# Patient Record
Sex: FEMALE | Race: WHITE | NOT HISPANIC OR LATINO | URBAN - METROPOLITAN AREA
[De-identification: names, ages, dates, MRNs, and addresses within clinical notes are randomized per-mention and may not be internally consistent; named-entity substitution may affect disease eponyms.]

---

## 2017-03-17 ENCOUNTER — RECORDS - HEALTHEAST (OUTPATIENT)
Dept: LAB | Facility: CLINIC | Age: 27
End: 2017-03-17

## 2017-03-17 LAB
CHOLEST SERPL-MCNC: 183 MG/DL
FASTING STATUS PATIENT QL REPORTED: YES
HDLC SERPL-MCNC: 62 MG/DL
LDLC SERPL CALC-MCNC: 113 MG/DL
TRIGL SERPL-MCNC: 41 MG/DL

## 2017-03-22 LAB
MTHFR MUTATION - HISTORICAL: NORMAL
SPECIMEN STATUS: NORMAL
THROMBOPHLIA RISK ASSESSMENT-MTR - HISTORICAL: NORMAL

## 2018-05-26 ENCOUNTER — TRANSFERRED RECORDS (OUTPATIENT)
Dept: HEALTH INFORMATION MANAGEMENT | Facility: CLINIC | Age: 28
End: 2018-05-26

## 2021-08-07 ENCOUNTER — TRANSFERRED RECORDS (OUTPATIENT)
Dept: HEALTH INFORMATION MANAGEMENT | Facility: CLINIC | Age: 31
End: 2021-08-07

## 2022-03-10 ENCOUNTER — TRANSFERRED RECORDS (OUTPATIENT)
Dept: HEALTH INFORMATION MANAGEMENT | Facility: CLINIC | Age: 32
End: 2022-03-10

## 2022-07-18 ENCOUNTER — MYC MEDICAL ADVICE (OUTPATIENT)
Dept: INTERNAL MEDICINE | Facility: CLINIC | Age: 32
End: 2022-07-18

## 2022-07-19 ENCOUNTER — VIRTUAL VISIT (OUTPATIENT)
Dept: INTERNAL MEDICINE | Facility: CLINIC | Age: 32
End: 2022-07-19
Payer: COMMERCIAL

## 2022-07-19 DIAGNOSIS — M24.9 HYPERMOBILE JOINTS: Primary | ICD-10-CM

## 2022-07-19 DIAGNOSIS — R51.9 CHRONIC DAILY HEADACHE: ICD-10-CM

## 2022-07-19 DIAGNOSIS — G43.709 CHRONIC MIGRAINE WITHOUT AURA WITHOUT STATUS MIGRAINOSUS, NOT INTRACTABLE: ICD-10-CM

## 2022-07-19 DIAGNOSIS — Z76.89 ENCOUNTER TO ESTABLISH CARE: ICD-10-CM

## 2022-07-19 PROCEDURE — 99215 OFFICE O/P EST HI 40 MIN: CPT | Mod: 95 | Performed by: NURSE PRACTITIONER

## 2022-07-19 RX ORDER — CALCIUM CARBONATE/VITAMIN D3 500-10/5ML
LIQUID (ML) ORAL
COMMUNITY
End: 2023-10-06

## 2022-07-19 RX ORDER — MULTIVIT-MIN/IRON/FOLIC ACID/K 18-600-40
2000 CAPSULE ORAL
COMMUNITY
End: 2024-07-05

## 2022-07-19 RX ORDER — LISDEXAMFETAMINE DIMESYLATE 30 MG/1
30 CAPSULE ORAL EVERY MORNING
COMMUNITY
End: 2023-10-06

## 2022-07-19 NOTE — PROGRESS NOTES
Екатерина is a 32 year old who is being evaluated via a billable video visit.      How would you like to obtain your AVS? MyChart  If the video visit is dropped, the invitation should be resent by: Send to e-mail at: [email or web address]  Will anyone else be joining your video visit? Tiffanie Levy VF    Assessment & Plan       Encounter to establish care  Hypermobile joints  Chronic daily headache  Chronic migraine without aura without status migrainosus, not intractable  Establishing care for a second opinion; she has had several healthcare providers suggest that she likely has EDS and would like to meet with Dr. Carpenter given his experience with this condition. Discussed limited genetic testing to confirm a diagnosis when primary symptom is hypermobility. Management involves treating individual symptoms, including careful movement/continue with PT as she has been doing as well as ongoing follow-up with Neurology for headache management. She is concerned about cervical instability related to EDS or CSF leak.  Encouraged continue with pushing fluids and electrolytes for management of POTS.  Will discuss further with Dr. Carpenter for possible consult.      51 minutes spent on the date of the encounter doing chart review, history and exam, documentation and further activities per the note      Return in about 8 weeks (around 9/13/2022).    MAJO Reyes [facility] INTERNAL MEDICINE [facility]   Екатерина is a 32 year old, presenting for the following health issues:  Video Visit (Est Care, Harvey-Danlos syndromes (EDS) - per patient )      HPI     Establish care.  Was hoping to work with Dr. Carpenter, suspects she has EDS.   Has been told by several other health care providers that she has features consistent with EDS, but has never been officially diagnosed.  Sprained fingers frequently in childhood but other than TMJ dislocation, no other significant joint injuries.  "Notes that joints sublax frequently, can pop in and out of place easily. Slight hypermobile elbow, fingers very hypermobile, and skin slightly hypermobile.   Had an autonomic assessment at [facility] last year.    Was active/athletic as a kid, around age 17 started getting low back pain, knee pain and had to stop running/swimming. Started PT. Things worsened in college--pain in feet and difficulty getting around campus. Started birth control, developed anxiety with this, so stopped it but still had \"intense PMS\". Was waiting for back to heal, became a , developed more hip and knee problems. Continued with PT over the last 10 years. General flu-like symptoms throughout college and frequent illnesses (had strep throat 5x, multiple UTIs, would develop symptoms of feeling sick, vomit, go to hospital to get fluids and would sleep for 2 weeks).   Attended college on [employer], was supposed to go to medical school but decided not to go due to health; was diagnosed with chronic lyme disease but she questions whether this is the right diagnosis (from a specialty lab, not NICOLLE or Western blot, started treatment with herbs and felt worse, had been prescribed antibiotics and antifungals but didn't feel right about that and didn't complete the treatment).  Moved to [place], continued to not feel well.  Had a dentist visit 7 years ago, had pressed really hard on TMJ (dislocated TMJ)--developed bad headache, pressure, and intense dizziness. Describes it felt \"ischemic\". Couldn't work after that, difficult to be upright.  Since that time, has chronic daily headaches which never fully resolve. Following with Neurology at [facility]. Not classic migraine presentation, using Vyvanse over the last 2 months to try to reduce baseline headaches. Anything with vasoconstricting action seems to help partially reduce pain. Otherwise managing with OTC Tylenol or Ibuprofen (helps sometimes). Has found that various treatments might decrease " "intensity for headache for a short period of time, but then lose effectiveness after a few months. Hard to read or do normal activities d/t headaches, on disability; this is the most concerning aspect of her health for her, is difficulty to have a headache all the time. Would like to meet with someone with familiarity with EDS (concerned about cervical instability or Neuro questioned a CSF leak since symptoms triggered by TMJ dislocation). Last head imaging I can find in Care Everywhere:  \"FINDINGS:  Interpretation of outside head MRI without IV contrast dated   08/15/2020.   No mass effect, hydrocephalus, or abnormal areas of restricted diffusion or hemosiderin deposition. No extra-axial fluid collection. No evidence of venous sinus engorgement. Pituitary gland appears normal for age. No convincing evidence of brain sag. Internal auditory canals and inner ear structures appear grossly normal on this noncontrast exam.\"    Hx Hashimotos (improved since cutting out gluten) and Sjogrens, follows with Rheumatology at [facility], had been prescribed Plaquenil but appears they questioned if it would actually be of benefit.  POTS--eval at [facility] in Aug 2021--Holter monitor showed sinus rhythm with sinus arrhythmia, HR , avg 73, one PAC, one PVC, no symptomatic events, five systolic BP drops <90 mmHg recorded. 24-hr BP monitor done. Hx in past of transient CP, work-up negative. Reports her dysautonomia improved with Neuro/Post-concussion clinic with visual exercises. Also drinks electrolytes all day long to manage.   Hx PCOS.  Treating TMJ at [facility] in [place], thinks it's maybe improving, working with specialty dentist ([place] in [place]).   Bad hip pain, hip dysplasia, suspects will be having surgery. Torn labrum (L side).  Following at [facility] with Dr. Walsh for Hip, has appt with Dr. Gage at [facility] coming up for 2nd opinion.     Hearing testing at [facility], reports hypermobile TM.  Had been on Naltrexone for 2 " months (helped briefly) for pcos. Primary in [place].  Per chart review in Care Everywhere, following with therapist through [facility] for hx depression, anxiety, trichotillomania, ADHD, bulimia nervosa (in remission), dyslexia, chronic pain.    Review of Systems   Constitutional, HEENT, cardiovascular, pulmonary, gi and gu systems are negative, except as otherwise noted.      Objective             Physical Exam   GENERAL: Healthy, alert and no distress  EYES: Eyes grossly normal to inspection.  No discharge or erythema, or obvious scleral/conjunctival abnormalities.  RESP: No audible wheeze, cough, or visible cyanosis.  No visible retractions or increased work of breathing.    SKIN: Visible skin clear. No significant rash, abnormal pigmentation or lesions. Demonstrates mild hyperextensible skin.  MSK: right elbow slightly hyperextends, bends thumbs/fingers to forearm  NEURO: Cranial nerves grossly intact.  Mentation and speech appropriate for age.  PSYCH: Mentation appears normal, affect normal/bright, judgement and insight intact, normal speech and appearance well-groomed.                Video-Visit Details    Video Start Time: 8:32 AM    Type of service:  Video Visit    Video End Time:8:59 AM    Originating Location (pt. Location): Home    Distant Location (provider location):  [facility] INTERNAL MEDICINE [place]     Platform used for Video Visit: [email or web address]    .  ..

## 2022-07-19 NOTE — PATIENT INSTRUCTIONS
Thank you for visiting the Primary Care Center today at the [facility]! The following is some information about our clinic:     Primary Care Center Frequently-Asked Questions    (1) How do I schedule appointments at the [facility]?     Primary Care--to schedule or make changes to an existing appointment, please call our primary care line at [phone].    Labs--to schedule a lab appointment at the [facility] you can use [employer] or call [phone]. If you have a [place] location that is closer to home, you can reach out to that location for scheduling options.     Imaging--if you need to schedule a CT, X-ray, MRI, ultrasound, or other imaging study you can call [phone] to schedule at the [facility] or any other [facility] imaging location.     Referrals--if a referral to another specialty was ordered you can expect a phone call from their scheduling team. If you have not heard from them in a week, please call us or send us a GENBAND message to check the status or get a scheduling number. Please note that this only applies to internal [facility] referrals. If the referral is external you would need to contact their office for scheduling.     (2) I have a question about my visit, who do I contact?     You can call us at the primary care line at [phone] to ask questions about your visit. You can also send a secure message through GENBAND, which is reviewed by clinic staff. Please note that [employer] messages have a twenty-four to forty-eight business hour turnaround time and should not be used for urgent concerns.    (3) How will I get the results of my tests?    If you are signed up for [employer] all tests will be released to you within twenty-four hours of resulting. Please allow three to five days for your doctor to review your results and place a note interpreting the results. If you do not have [employer]AL[employer] you will receive your  results through mail seven to ten business days following the return of the tests. Please note that if there should be any urgent or concerning results that your doctor or their registered nurse will reach out to you the same day as the tests come back. If you have follow up questions about your results or would like to discuss the results in detail please schedule a follow up with your provider either in person or virtually.     (4) How do I get refills of my prescriptions?     You should always first contact your pharmacy for refills of your medications. If submitting a refill request on [employer], please be sure to submit the request only once--repeat requests can cause delays in refill. If you are requesting a NEW medication or a medication related to new symptoms you will need to schedule an appointment with a provider prior to approval. Please note: Routine medication refills have up to one to three business day turnaround whereas controlled substances refills have up to five to seven business day turnaround.    (5) I have new symptoms, what do I do?     If you are having an immediate medical emergency, you should dial 911 for assistance.   For anything urgent that needs to be seen within a few hours to one day you should visit a local urgent care for assistance.  For non-urgent symptoms that need to be seen within a few days to a week you can schedule with an available provider in primary care by going to [employer] or calling [phone].   If you are not sure how serious your symptoms are or you would like to receive medical advice you can always call [phone] to speak with a triage nurse.

## 2022-07-19 NOTE — TELEPHONE ENCOUNTER
Patient already had her virtual visit this morning.      Darrell Campo CMA ([facility]) at 4:30 PM on 7/19/2022

## 2022-07-23 PROBLEM — F41.1 GENERALIZED ANXIETY DISORDER: Status: ACTIVE | Noted: 2022-05-04

## 2022-07-23 PROBLEM — F81.0 SPECIFIC LEARNING DISORDER WITH READING IMPAIRMENT: Status: ACTIVE | Noted: 2022-05-04

## 2022-07-23 PROBLEM — G89.4 CHRONIC PAIN DISORDER: Status: ACTIVE | Noted: 2022-05-04

## 2022-07-23 PROBLEM — F54 PSYCHOLOGICAL FACTORS AFFECTING MEDICAL CONDITION: Status: ACTIVE | Noted: 2022-05-04

## 2022-07-23 PROBLEM — Z86.19 HISTORY OF LYME DISEASE: Status: ACTIVE | Noted: 2018-06-11

## 2022-07-23 PROBLEM — R79.89 ELEVATED DEHYDROEPIANDROSTERONE (DHEA) LEVEL: Status: ACTIVE | Noted: 2022-01-06

## 2022-07-23 PROBLEM — R76.8 SS-A ANTIBODY POSITIVE: Status: ACTIVE | Noted: 2020-08-26

## 2022-07-23 PROBLEM — F32.A DEPRESSION: Status: ACTIVE | Noted: 2018-11-21

## 2022-07-23 PROBLEM — F33.1 MAJOR DEPRESSIVE DISORDER, RECURRENT EPISODE, MODERATE (H): Status: ACTIVE | Noted: 2022-05-04

## 2022-07-23 PROBLEM — E28.2 PCOS (POLYCYSTIC OVARIAN SYNDROME): Status: ACTIVE | Noted: 2019-11-25

## 2022-07-23 PROBLEM — G43.709 CHRONIC MIGRAINE WITHOUT AURA WITHOUT STATUS MIGRAINOSUS, NOT INTRACTABLE: Status: ACTIVE | Noted: 2020-06-12

## 2022-07-23 PROBLEM — R53.83 OTHER FATIGUE: Status: ACTIVE | Noted: 2018-11-21

## 2022-07-23 PROBLEM — F50.25: Status: ACTIVE | Noted: 2022-05-04

## 2022-07-23 PROBLEM — F63.3 TRICHOTILLOMANIA: Status: ACTIVE | Noted: 2022-05-04

## 2022-07-23 PROBLEM — M35.00 SJOGREN'S SYNDROME WITHOUT EXTRAGLANDULAR INVOLVEMENT (H): Status: ACTIVE | Noted: 2022-01-06

## 2022-07-25 ENCOUNTER — TELEPHONE (OUTPATIENT)
Dept: INTERNAL MEDICINE | Facility: CLINIC | Age: 32
End: 2022-07-25

## 2022-08-03 ENCOUNTER — TELEPHONE (OUTPATIENT)
Dept: INTERNAL MEDICINE | Facility: CLINIC | Age: 32
End: 2022-08-03

## 2022-08-03 NOTE — TELEPHONE ENCOUNTER
----- Message from MAJO Farfan CNP sent at 8/3/2022 10:32 AM CDT -----  Regarding: Schedule for one-time consult  Hello,    Would you help this patient schedule a one-time EDS consult with Dr. Carpenter? She understands that he is unable to take on new patients to establish care, but I spoke with him and he agreed to a consult for her.    Please let me know if you have any questions.  Thank you!  Geri

## 2022-08-03 NOTE — TELEPHONE ENCOUNTER
[facility] Dr. Carpenter for 30min slot - okayed per provider, no JOSE

## 2022-09-16 ENCOUNTER — TRANSFERRED RECORDS (OUTPATIENT)
Dept: HEALTH INFORMATION MANAGEMENT | Facility: CLINIC | Age: 32
End: 2022-09-16

## 2023-04-28 ENCOUNTER — MEDICAL CORRESPONDENCE (OUTPATIENT)
Dept: HEALTH INFORMATION MANAGEMENT | Facility: CLINIC | Age: 33
End: 2023-04-28
Payer: COMMERCIAL

## 2023-05-01 ENCOUNTER — TRANSCRIBE ORDERS (OUTPATIENT)
Dept: OTHER | Age: 33
End: 2023-05-01

## 2023-05-01 DIAGNOSIS — Q38.2 MACROGLOSSIA: Primary | ICD-10-CM

## 2023-05-16 NOTE — TELEPHONE ENCOUNTER
FUTURE VISIT INFORMATION:      FUTURE VISIT INFORMATION:    Date: 6/7/23    Time: 1 PM    Location: [facility] [place]  REFERRAL INFORMATION:    Referring provider: Dr. Alejo Gonzales    Referring providers clinic: [facility]    Reason for visit/diagnosis:  macroglossia of the base of tongue. further assessment and possible workup for transoral robotic base of tongue reduction surgical intervention - Referred by Dr. Alejo Gonzales @ [facility]    RECORDS REQUESTED FROM:       Clinic name Comments Records Status Imaging Status   [facility] 9/16/22 OV note- Dr. Alejo Gonzales Scanned in Epic

## 2023-05-22 ENCOUNTER — TRANSCRIBE ORDERS (OUTPATIENT)
Dept: OTHER | Age: 33
End: 2023-05-22

## 2023-05-22 DIAGNOSIS — G43.909 MIGRAINE SYNDROME: Primary | ICD-10-CM

## 2023-06-07 ENCOUNTER — PRE VISIT (OUTPATIENT)
Dept: OTOLARYNGOLOGY | Facility: CLINIC | Age: 33
End: 2023-06-07

## 2023-08-07 ENCOUNTER — TRANSFERRED RECORDS (OUTPATIENT)
Dept: HEALTH INFORMATION MANAGEMENT | Facility: CLINIC | Age: 33
End: 2023-08-07
Payer: COMMERCIAL

## 2023-08-15 NOTE — TELEPHONE ENCOUNTER
FUTURE VISIT INFORMATION:        FUTURE VISIT INFORMATION:  Date:  10/11/23  Time:  1 PM  Location: [facility] [place]  REFERRAL INFORMATION:  Referring provider: Dr. Alejo Gonzales  Referring providers clinic: [facility]  Reason for visit/diagnosis:  macroglossia of the base of tongue. further assessment and possible workup for transoral robotic base of tongue reduction surgical intervention - Referred by Dr. Alejo Gonzales @ [facility]     RECORDS REQUESTED FROM:         Clinic name Comments Records Status Imaging Status   [facility] 9/16/22 OV note- Dr. Alejo Gonzales Scanned in [facility]   [facility] MRN: [id number] MR Cervical 08/07/23  MR Brain 08/07/23  MR TMJ [id number]  send to scan 8/15/23 Pending req 8/15/23   PACS           August 15, 2023 at 2:04 PM - send req to [facility] for more records  and request for images from [facility]  August 15, 2023 at 3:22 PM - No additional records from [facility]  August 22, 2023 at 6:52 AM - Images resolved in [facility] -[facility]

## 2023-08-21 NOTE — TELEPHONE ENCOUNTER
Records Requested     August 22, 2023 8:35 AM   [id number]   Facility  [facility]    Outcome 8:39am Sent request for imaging to be pushed to PACS. -ROBYN Lam on 9/19/2023 at 11:46 AM Imaging resolved into PACS. -ROBYN     RECORDS RECEIVED FROM: Care Everywhere    REASON FOR VISIT: Migraine syndrome [G43.909]   Date of Appt: 10/23/23 8:00am    NOTES (FOR ALL VISITS) STATUS DETAILS   OFFICE NOTE from referring provider Care Everywhere 5/22/23 Dina Peraza MD @[facility]     OFFICE NOTE from other specialist Care Everywhere 5/10/23 (Telemedicine) Solheid, Corinne J, MD  @\[facility]    10/6/22, 7/19/22, 12/17/21 [facility]    7/19/22 Geri Loyola APRN CNP @[facility]     DISCHARGE REPORT from the ER Care Everywhere 12/20/22 Jovanna Martinez PA-C  @[facility]     MEDICATION LIST Internal    IMAGING  (FOR ALL VISITS)     X-RAY PACS   7/18/23 XR Cervical Spine     MRI (HEAD, NECK, SPINE) Internal [facility]  8/7/23 MR Brain  8/7/23 MR Cervical Spine  3/10/22 MR Temporomandibular Joints

## 2023-09-09 ENCOUNTER — HEALTH MAINTENANCE LETTER (OUTPATIENT)
Age: 33
End: 2023-09-09

## 2023-09-20 ENCOUNTER — PRE VISIT (OUTPATIENT)
Dept: OTOLARYNGOLOGY | Facility: CLINIC | Age: 33
End: 2023-09-20

## 2023-09-21 ENCOUNTER — TELEPHONE (OUTPATIENT)
Dept: INTERNAL MEDICINE | Facility: CLINIC | Age: 33
End: 2023-09-21
Payer: COMMERCIAL

## 2023-09-21 NOTE — TELEPHONE ENCOUNTER
The patient would like a call from the care team to discuss her upcoming appointment in detail thankyou

## 2023-09-22 ENCOUNTER — OFFICE VISIT (OUTPATIENT)
Dept: INTERNAL MEDICINE | Facility: CLINIC | Age: 33
End: 2023-09-22
Payer: COMMERCIAL

## 2023-09-22 VITALS
BODY MASS INDEX: 29.82 KG/M2 | OXYGEN SATURATION: 96 % | SYSTOLIC BLOOD PRESSURE: 126 MMHG | DIASTOLIC BLOOD PRESSURE: 88 MMHG | HEART RATE: 74 BPM | WEIGHT: 173.7 LBS

## 2023-09-22 DIAGNOSIS — Q79.62 HYPERMOBILE EHLERS-DANLOS SYNDROME: Primary | ICD-10-CM

## 2023-09-22 DIAGNOSIS — Q79.60 EDS (EHLERS-DANLOS SYNDROME): ICD-10-CM

## 2023-09-22 PROCEDURE — 99213 OFFICE O/P EST LOW 20 MIN: CPT | Performed by: PEDIATRICS

## 2023-09-22 RX ORDER — ATOGEPANT 60 MG/1
1 TABLET ORAL DAILY
COMMUNITY
Start: 2023-04-25 | End: 2023-10-06

## 2023-09-22 RX ORDER — CLINDAMYCIN PHOSPHATE 10 MG/G
GEL TOPICAL
COMMUNITY
Start: 2023-09-20 | End: 2023-10-06

## 2023-09-22 RX ORDER — METOCLOPRAMIDE 10 MG/1
TABLET ORAL
COMMUNITY
Start: 2023-09-14 | End: 2024-07-05

## 2023-09-22 RX ORDER — COVID-19 MOLECULAR TEST ASSAY
KIT MISCELLANEOUS
COMMUNITY
Start: 2022-10-21

## 2023-09-22 RX ORDER — ONDANSETRON 4 MG/1
TABLET, FILM COATED ORAL
COMMUNITY
Start: 2022-12-22 | End: 2023-10-06

## 2023-09-22 RX ORDER — LACTIC ACID, L-, CITRIC ACID MONOHYDRATE, AND POTASSIUM BITARTRATE 90; 50; 20 MG/5G; MG/5G; MG/5G
1 GEL VAGINAL
COMMUNITY
Start: 2022-12-21 | End: 2024-07-05

## 2023-09-22 RX ORDER — RIZATRIPTAN BENZOATE 10 MG/1
TABLET ORAL
COMMUNITY
End: 2023-10-06

## 2023-09-22 RX ORDER — PROMETHAZINE HYDROCHLORIDE 25 MG/1
TABLET ORAL
COMMUNITY
Start: 2023-08-23 | End: 2024-07-05

## 2023-09-22 RX ORDER — SPIRONOLACTONE 25 MG/1
TABLET ORAL
COMMUNITY
Start: 2023-04-25 | End: 2023-10-06

## 2023-09-22 RX ORDER — FLASH GLUCOSE SENSOR
KIT MISCELLANEOUS
COMMUNITY
Start: 2022-12-21 | End: 2023-10-06

## 2023-09-22 RX ORDER — NARATRIPTAN 1 MG/1
TABLET ORAL
COMMUNITY
Start: 2023-08-25 | End: 2023-10-06

## 2023-09-22 RX ORDER — COVID-19 MOLECULAR TEST ASSAY
KIT MISCELLANEOUS
COMMUNITY
Start: 2023-02-01 | End: 2023-10-06

## 2023-09-22 RX ORDER — MEMANTINE HYDROCHLORIDE 5 MG/1
TABLET ORAL
COMMUNITY
Start: 2023-08-31 | End: 2023-10-06

## 2023-09-22 RX ORDER — UBROGEPANT 50 MG/1
TABLET ORAL
COMMUNITY
Start: 2023-09-01 | End: 2023-10-06

## 2023-09-22 RX ORDER — CYCLOBENZAPRINE HCL 5 MG
5 TABLET ORAL AT BEDTIME
COMMUNITY
Start: 2023-07-18 | End: 2023-10-06

## 2023-09-22 RX ORDER — NABUMETONE 500 MG/1
TABLET, FILM COATED ORAL
COMMUNITY
Start: 2023-09-08 | End: 2023-10-06

## 2023-09-22 RX ORDER — ONDANSETRON 4 MG/1
1 TABLET, ORALLY DISINTEGRATING ORAL EVERY 8 HOURS PRN
COMMUNITY
Start: 2023-07-18

## 2023-09-22 NOTE — PROGRESS NOTES
"Dear patient. Thank you for visiting with me. I want you to feel respected, understood, and empowered. \"Respect\" is valuing you as much as I would a close family member. \"Empowerment\" happens when you are fully informed, and can make the best possible decision for you.  Please ask me questions!  Challenge anything that is not clear.    Medical records are primarily used as memory aids for me and my colleagues. Things to know about my documentation style:  - The 'problem list' includes current symptoms or diagnoses, and some problems that are resolved but may return. I use the past medical history for problems not expected to return.  - I use single quotation marks for things that you or I said, when I want to clarify who was speaking.  - I use double quotation marks when copying a term from elsewhere in your records. Italics (besides here) may also denote a quotation.  If you have questions or concerns, please contact me; I will reply as soon as time allows.    Subjective     Екатерина Lucero is a 33 year old woman, with concerns including:  Chief Complaint   Patient presents with    Consult     Pt is here to consult over EDS     PCP: Geri Loyola   Visit type: problem-oriented      This patient was seen for my colleague Geri Loyola, regarding EDS. I see that she already had her diagnosis visit by Dr. Sung 9/30/22. Therefore it didn't seem indicated to do a full consult in the rare disease program.     Genetic testing has already been done, and was reportedly normal (counseled about this)    I explained about hypermobility spectrum disorders and EDS, what these conditions are, and what to do about them. We also discussed how the official designation of EDS is no longer particularly important. Some of this information is provided in the patient instruction section of this encounter. The keys for HMS are to (a) work on muscle strength around problem joints, (b) protect problem joints in unusual " circumstances of strain or effort, (c) be aware of additional conditions that may develop such as autonomic dysfunction, and (d) ensure that additional conditions do not become worse because of inactivity or other pitfalls.       Additional issues:  She had POTS and autonomic dysfunction when younger but both are a lot better.  Vestibular migraines that she suspects are due to TMD/discolocation. Has trouble reading sometimes, and driving. Was diagnosed at [place], but is on the waiting list for Dr. Palacios, although she said that she would see any of them. Currently working with [facility].   They did a vestibular evaluation and said it wasn't her inner ear. She hasn't had CSF evaluation. We discussed this possibility, in advance of it being brought up by others.  TMD. Sees a PT in [facility] who has her own project as part of [facility]. Sees a TMJ dentist in [place]. Wears a twin block which helped.      About this visit:  Time note (e3, 20'): The total time (on the date of service) for this service was 27 minutes, including discussion/face-to-face, chart review, interpretation not otherwise reported, documentation, and updating of the computerized record.

## 2023-09-22 NOTE — NURSING NOTE
Екатерина Lucero is a 33 year old female that presents in clinic today for the following:     Chief Complaint   Patient presents with    Consult     Pt is here to consult over EDS       The patient's allergies and medications were reviewed. The patient's vitals were obtained without incident. The patient does not have any other questions for the provider.     Lukasz Celestin, EMT at 1:27 PM on 9/22/2023.  Primary Care Clinic: [phone]

## 2023-10-05 ENCOUNTER — TELEPHONE (OUTPATIENT)
Dept: NEUROLOGY | Facility: CLINIC | Age: 33
End: 2023-10-05
Payer: COMMERCIAL

## 2023-10-05 NOTE — TELEPHONE ENCOUNTER
Called waitlist patient and offered an appointment with Dr. Amaya on this date 10/6/2023 & time 8am or 9am at this location [facility].     Please note there is no guarantee this appointment will be available as it is first come first serve.    If patient calls back, ok to schedule. Slots are held for wait list patients.    Oscar Ramirez on 10/5/2023 at 4:46 PM

## 2023-10-06 ENCOUNTER — PRE VISIT (OUTPATIENT)
Dept: NEUROLOGY | Facility: CLINIC | Age: 33
End: 2023-10-06

## 2023-10-06 ENCOUNTER — VIRTUAL VISIT (OUTPATIENT)
Dept: NEUROLOGY | Facility: CLINIC | Age: 33
End: 2023-10-06
Attending: FAMILY MEDICINE
Payer: COMMERCIAL

## 2023-10-06 VITALS — WEIGHT: 155 LBS | BODY MASS INDEX: 26.61 KG/M2

## 2023-10-06 DIAGNOSIS — M26.609 TEMPOROMANDIBULAR JOINT DISORDER: ICD-10-CM

## 2023-10-06 DIAGNOSIS — R42 DIZZINESS: ICD-10-CM

## 2023-10-06 DIAGNOSIS — Q79.60 EDS (EHLERS-DANLOS SYNDROME): ICD-10-CM

## 2023-10-06 DIAGNOSIS — G43.719 INTRACTABLE CHRONIC MIGRAINE WITHOUT AURA AND WITHOUT STATUS MIGRAINOSUS: Primary | ICD-10-CM

## 2023-10-06 PROCEDURE — 99205 OFFICE O/P NEW HI 60 MIN: CPT | Mod: 95 | Performed by: PSYCHIATRY & NEUROLOGY

## 2023-10-06 PROCEDURE — 99417 PROLNG OP E/M EACH 15 MIN: CPT | Mod: 95 | Performed by: PSYCHIATRY & NEUROLOGY

## 2023-10-06 RX ORDER — DIPHENHYDRAMINE HCL 50 MG
50 CAPSULE ORAL EVERY 6 HOURS PRN
COMMUNITY
Start: 2023-10-06 | End: 2024-07-05

## 2023-10-06 RX ORDER — CYCLOBENZAPRINE HCL 5 MG
5 TABLET ORAL AT BEDTIME
Qty: 30 TABLET | Refills: 11 | Status: SHIPPED | OUTPATIENT
Start: 2023-10-06

## 2023-10-06 RX ORDER — RIMEGEPANT SULFATE 75 MG/75MG
75 TABLET, ORALLY DISINTEGRATING ORAL
COMMUNITY
End: 2023-10-10

## 2023-10-06 ASSESSMENT — HEADACHE IMPACT TEST (HIT 6)
HOW OFTEN DO HEADACHES LIMIT YOUR DAILY ACTIVITIES: ALWAYS
WHEN YOU HAVE A HEADACHE HOW OFTEN DO YOU WISH YOU COULD LIE DOWN: ALWAYS
WHEN YOU HAVE HEADACHES HOW OFTEN IS THE PAIN SEVERE: ALWAYS
HOW OFTEN HAVE YOU FELT TOO TIRED TO WORK BECAUSE OF YOUR HEADACHES: VERY OFTEN
HIT6 TOTAL SCORE: 76
HOW OFTEN DID HEADACHS LIMIT CONCENTRATION ON WORK OR DAILY ACTIVITY: ALWAYS
HOW OFTEN HAVE YOU FELT FED UP OR IRRITATED BECAUSE OF YOUR HEADACHES: ALWAYS

## 2023-10-06 ASSESSMENT — PAIN SCALES - GENERAL: PAINLEVEL: MODERATE PAIN (4)

## 2023-10-06 NOTE — PROGRESS NOTES
Virtual Visit Details    Type of service:  Video Visit   Video Start Time:  9:18AM  Video End Time:10:16 AM    Originating Location (pt. Location): Home    Distant Location (provider location):  Off-site  Platform used for Video Visit: Volodymyr

## 2023-10-06 NOTE — LETTER
10/6/2023       RE: Екатерина Lucero  [address] [facility] [address]     Dear Colleague,    Thank you for referring your patient, Екатерина Lucero, to the [facility] NEUROLOGY CLINIC [place] at [facility]. Please see a copy of my visit note below.            Екатерина Lucero MRN# [id number]   Age: 33 year old YOB: 1990     Requesting physician: Geri Graf            Assessment and Plan:     (G43.719) Chronic migraine without aura without status migrainosus, intractable  (primary encounter diagnosis)  Comment: Intractable symptoms causing significant disability leading to her being unable to work. I am not hoepful we can return her to that state of health pre-2016 but I am hopeful we can get symptoms under better control with a goal of 50% reduction.   Main focus should be on prevention. I discussed that I don't think Namenda is best choice, also I don't feel comfortable with Nurtec as a preventive plus Ubrelvy as rescue. Should be one or the other. She likes Ubrelvy for rescue so she will switch the Nurtec to Emgality. In my experience we are not able to match the pay online telemedicine service sin DICK chin. I am hopeful insurance will pay for Emgality.     Plan:   Prevention: Emgality 240 mg loading dose and 120 mg maintenance. Demo pen at in person appointment.  Acute: Ubrelvy 100mg if fails Reglan plus Benadryl. Goal to keep out of the ED  Follow up after 6 months and on 10/10/23  Consider Multidisciplinary headache clini    (M26.539) Temporomandibular joint disorder  Comment: May benefit from [facility] TMD clinic. Consider referral. For now cyclobenzaprine nightly  Plan: Cyclobenzaprine 5 mg QHS    (Q79.60) EDS (Harvey-Danlos syndrome)  Comment: Makes Botox a less ideal treatment but could consider in future avoiding cervical paraspinals. She understands importance of strengthening.      (R42)  PPPD  Comment: I am not sure about this diagnosis. Sounds like she has vertigo as a symptoms of migraine not separate. If separate she would benefit from selective serotonin reuptake inhibitor or SNRI and vestibular therapy.     I am concerned her history of depression and anxiety predispose her to poor outcomes. She is actively seeing mental health provider. Doing ok for now considering how much pain she is in. Certainly pain is disabling and affecting her quality of life.       At the beginning of the appointment the patient had mentioned illness. I had offered to reschedule into next week on Tuesday but she wanted to try the appt today. After over one hour on video she requests additional time on Tuesday October 10th. I can offer her a 30 min appt time only on that day and we will add on to the end of my daily schedule. I did let her know I thionk 6 month appointments is really as often as my clinic can accommodate her visits. We may need an ROMAN to co-manage if she needs more frequent appointments.     On the day of the appointment I spent 93 minutes caring for the patient. That included video time and chart review/documentation time.     Shazia Amaya MD           History of Present Illness:     chief complaint:   Chief Complaint   Patient presents with    Consult        Екатерина Lucero is a 33 year old patient presenting for assessment of headaches. She reports she has been living with chronic migraines since 2016. She has seen Dr Flaherty at [place]. She reports it is difficult to get follow ups and my chart messages back prompting her to want to transfer care to the [facility] system.  She had worked with a telemedicine clinic ([facility]?) to bridge the gap. We were supposed to meet in person but she is feeling under the weather so it was changed to virtual. She has records from the telemedicine clinic printed out.     Prior to 2016 she didn't really have issues with headaches, maybe having them once a month with  dehydration. 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 She would be able to work hard, stay up all night with no issues. Problems began in 2016, she needed a new  for bruxism. She saw a new dentist and he pressed hard on the TMJ and it caused instant problems that have not resolved since. He knocked her off her disc and she had a complete bite change. Subsequently she has been diagnosed with EDS which explains why this exam could have caused such an issue. The pain that developed was a sudden pressure in her head ( felt like a blood pressure cuff pumped up) and it didn't go away. Within the first few days she felt dizziness (sensation of movement). Later she also had some visual changes. A lot of dizziness when looking at paper the eye shifting would trigger double vision and dizziness and nausea. Currently very disabling because she can't read much. Has been diagnosed with PPPD and chronic vestibular migraine. Has never returned to normal.    Current headache symptoms:  Frequency: Daily Most of the time moderate, Severe attacks one to 3 days a week  Quality: Feels like her brain is jiggling in her head.   Location: Frontal headache at baseline can spread to whole head. During a severe attack pain behind right eye.  Duration: constant  Associated symptoms: dizziness, nausea, photophobia, lesser degree phonophobia  Awakens from sleep due to sx's:  No  Precipitating Injury:  Yes  Dental exam triggering jaw injury.   Triggers: turning head, reading, screens, menstrual cycle, eye movement    Previous Treatment Trials:  Acute therapies:  Rizatriptan tried a lot of triptans and don't respond well  Naratriptan   Sumatriptan  Ubrelvy 50 mg she thought it was effective but does not abort migraine  Ubrelvy 100 mg new medicine recently prescribed  Cyclobenzaprine 5 mg prn using 2 times a month  Toradol/Benadryl/Zofran    Chronic therapies:  Gabapentin brief trial not  "tolerated  Cyclobenzaprine 5 mg at night initially helped for first few days then shot back up. Only using PRN  PT on neck (neck is tense because it is overworking from EDS does not have \"true cervical cranial instability\"  Qulipta helped at first and then wore off. Also caused nausea and fatigue sop ti was stopped  Venlafaxine she tried it at a lower dose and felt nauseous. 37.5 mg dose tried could not tolerate higher  Topiramate 9 not tried history of eating disorder best to avoid. Also has diagnosis of ADHD might make that worse  Nurtec 75 every other day just received hasn't started. Was supposed to be used with Ubrelvy?  Was also given Namenda 5 mg but hasn't started  Low dose Naltrexone started for PCOS and autoimmune stuff. No effect on headache  Neurivio for prevention and acute. Using helps a little  Magnesium oxide no help    She is seeing a neuro-ophthalmologist Dr Bustos    Seeing a TMD specialist in [place].   Diagnosed with macroglossia and seeing Dr Cabello next week         Physical Exam:     Limited due to video. She will be seen in person 10/10 at her request in person. We will complete visit then.             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   Applying for disability now.    No plans for pregnancy. She is using condoms and tracking cycle.     Past medical history significant for hisotry of bulimia, ADHD, PCOS, EDS, Anxiety, Depression, Trichotillomania.          Again, thank you for allowing me to participate in the care of your patient.      Sincerely,    Shazia Amaya MD    "

## 2023-10-06 NOTE — TELEPHONE ENCOUNTER
RECORDS RECEIVED FROM: Care Everywhere   REASON FOR VISIT: Migraine syndrome [G43.909]   Date of Appt: 10/6/23 9:00 am    NOTES (FOR ALL VISITS) STATUS DETAILS   OFFICE NOTE from referring provider Care Everywhere 5/22/23 Dina Peraza MD @[facility]         OFFICE NOTE from other specialist Care Everywhere 5/10/23 (Telemedicine) Solheid, Corinne J, MD  @\[facility]     10/6/22, 7/19/22, 12/17/21 [facility]D.  [facility]     7/19/22 Geri Loyola APRN CNP @[facility]      DISCHARGE REPORT from the ER Care Everywhere 12/20/22 Jovanna Martinez PA-C  @[facility]      MEDICATION LIST Internal    IMAGING  (FOR ALL VISITS)     MRI (HEAD, NECK, SPINE) Internal [facility]  8/7/23 MR Brain  8/7/23 MR Cervical Spine  3/10/22 MR Temporomandibular Joints     X-RAY  Internal HP  7/18/23 XR Cervical Spine

## 2023-10-06 NOTE — PROGRESS NOTES
Екатерина Lucero MRN# [id number]   Age: 33 year old YOB: 1990     Requesting physician: Geri Graf            Assessment and Plan:     (G43.719) Chronic migraine without aura without status migrainosus, intractable  (primary encounter diagnosis)  Comment: Intractable symptoms causing significant disability leading to her being unable to work. I am not hoepful we can return her to that state of health pre-2016 but I am hopeful we can get symptoms under better control with a goal of 50% reduction.   Main focus should be on prevention. I discussed that I don't think Namenda is best choice, also I don't feel comfortable with Nurtec as a preventive plus Ubrelvy as rescue. Should be one or the other. She likes Ubrelvy for rescue so she will switch the Nurtec to Emgality. In my experience we are not able to match the pay online telemedicine service sin [place] equivalence. I am hopeful insurance will pay for Emgality.     Plan:   Prevention: Emgality 240 mg loading dose and 120 mg maintenance. Demo pen at in person appointment.  Acute: Ubrelvy 100mg if fails Reglan plus Benadryl. Goal to keep out of the ED  Follow up after 6 months and on 10/10/23  Consider Multidisciplinary headache clini    (M26.609) Temporomandibular joint disorder  Comment: May benefit from [facility] TMD clinic. Consider referral. For now cyclobenzaprine nightly  Plan: Cyclobenzaprine 5 mg QHS    (Q79.60) EDS (Harvey-Danlos syndrome)  Comment: Makes Botox a less ideal treatment but could consider in future avoiding cervical paraspinals. She understands importance of strengthening.      (R42) PPPD  Comment: I am not sure about this diagnosis. Sounds like she has vertigo as a symptoms of migraine not separate. If separate she would benefit from selective serotonin reuptake inhibitor or SNRI and vestibular therapy.     I am concerned her history of depression and anxiety predispose her to poor outcomes. She is  actively seeing mental health provider. Doing ok for now considering how much pain she is in. Certainly pain is disabling and affecting her quality of life.       At the beginning of the appointment the patient had mentioned illness. I had offered to reschedule into next week on Tuesday but she wanted to try the appt today. After over one hour on video she requests additional time on Tuesday October 10th. I can offer her a 30 min appt time only on that day and we will add on to the end of my daily schedule. I did let her know I thionk 6 month appointments is really as often as my clinic can accommodate her visits. We may need an ROMAN to co-manage if she needs more frequent appointments.     On the day of the appointment I spent 93 minutes caring for the patient. That included video time and chart review/documentation time.     Shazia Amaya MD           History of Present Illness:     chief complaint:   Chief Complaint   Patient presents with    Consult        Екатерина Lucero is a 33 year old patient presenting for assessment of headaches. She reports she has been living with chronic migraines since 2016. She has seen Dr Flaherty at [place]. She reports it is difficult to get follow ups and my chart messages back prompting her to want to transfer care to the [facility] system.  She had worked with a telemedicine clinic ([facility]?) to bridge the gap. We were supposed to meet in person but she is feeling under the weather so it was changed to virtual. She has records from the telemedicine clinic printed out.     Prior to 2016 she didn't really have issues with headaches, maybe having them once a month with dehydration. 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 She would be able to work hard, stay up all night with no issues. Problems began in 2016, she needed a new  for bruxism. She saw a new dentist and he pressed hard on the TMJ and it caused instant problems that have not  "resolved since. He knocked her off her disc and she had a complete bite change. Subsequently she has been diagnosed with EDS which explains why this exam could have caused such an issue. The pain that developed was a sudden pressure in her head ( felt like a blood pressure cuff pumped up) and it didn't go away. Within the first few days she felt dizziness (sensation of movement). Later she also had some visual changes. A lot of dizziness when looking at paper the eye shifting would trigger double vision and dizziness and nausea. Currently very disabling because she can't read much. Has been diagnosed with PPPD and chronic vestibular migraine. Has never returned to normal.    Current headache symptoms:  Frequency: Daily Most of the time moderate, Severe attacks one to 3 days a week  Quality: Feels like her brain is jiggling in her head.   Location: Frontal headache at baseline can spread to whole head. During a severe attack pain behind right eye.  Duration: constant  Associated symptoms: dizziness, nausea, photophobia, lesser degree phonophobia  Awakens from sleep due to sx's:  No  Precipitating Injury:  Yes  Dental exam triggering jaw injury.   Triggers: turning head, reading, screens, menstrual cycle, eye movement    Previous Treatment Trials:  Acute therapies:  Rizatriptan tried a lot of triptans and don't respond well  Naratriptan   Sumatriptan  Ubrelvy 50 mg she thought it was effective but does not abort migraine  Ubrelvy 100 mg new medicine recently prescribed  Cyclobenzaprine 5 mg prn using 2 times a month  Toradol/Benadryl/Zofran    Chronic therapies:  Gabapentin brief trial not tolerated  Cyclobenzaprine 5 mg at night initially helped for first few days then shot back up. Only using PRN  PT on neck (neck is tense because it is overworking from EDS does not have \"true cervical cranial instability\"  Qulipta helped at first and then wore off. Also caused nausea and fatigue sop ti was stopped  Venlafaxine she " tried it at a lower dose and felt nauseous. 37.5 mg dose tried could not tolerate higher  Topiramate 9 not tried history of eating disorder best to avoid. Also has diagnosis of ADHD might make that worse  Nurtec 75 every other day just received hasn't started. Was supposed to be used with Ubrelvy?  Was also given Namenda 5 mg but hasn't started  Low dose Naltrexone started for PCOS and autoimmune stuff. No effect on headache  Neurivio for prevention and acute. Using helps a little  Magnesium oxide no help    She is seeing a neuro-ophthalmologist Dr Bustos    Seeing a TMD specialist in [place].   Diagnosed with macroglossia and seeing Dr Cabello next week         Physical Exam:     Limited due to video. She will be seen in person 10/10 at her request in person. We will complete visit then.             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   Applying for disability now.    No plans for pregnancy. She is using condoms and tracking cycle.     Past medical history significant for hisotry of bulimia, ADHD, PCOS, EDS, Anxiety, Depression, Trichotillomania.

## 2023-10-06 NOTE — NURSING NOTE
Is the patient currently in the state of [place]? YES    Visit mode:VIDEO    If the visit is dropped, the patient can be reconnected by: VIDEO VISIT: Text to cell phone:   Telephone Information:   Mobile [phone]       Will anyone else be joining the visit? NO  (If patient encounters technical issues they should call [phone])    How would you like to obtain your AVS? MyChart    Are changes needed to the allergy or medication list? No, Pt stated no changes to allergies, and Pt stated no med changes    Reason for visit: Consult    Maribel OROZCO

## 2023-10-10 ENCOUNTER — OFFICE VISIT (OUTPATIENT)
Dept: NEUROLOGY | Facility: CLINIC | Age: 33
End: 2023-10-10
Payer: COMMERCIAL

## 2023-10-10 ENCOUNTER — TELEPHONE (OUTPATIENT)
Dept: NEUROLOGY | Facility: CLINIC | Age: 33
End: 2023-10-10

## 2023-10-10 VITALS — OXYGEN SATURATION: 94 % | RESPIRATION RATE: 16 BRPM

## 2023-10-10 DIAGNOSIS — H53.9 VISUAL DISTURBANCE: ICD-10-CM

## 2023-10-10 DIAGNOSIS — G43.719 INTRACTABLE CHRONIC MIGRAINE WITHOUT AURA AND WITHOUT STATUS MIGRAINOSUS: Primary | ICD-10-CM

## 2023-10-10 DIAGNOSIS — M26.609 TEMPOROMANDIBULAR JOINT DISORDER: ICD-10-CM

## 2023-10-10 DIAGNOSIS — Q79.60 EDS (EHLERS-DANLOS SYNDROME): ICD-10-CM

## 2023-10-10 PROCEDURE — 99215 OFFICE O/P EST HI 40 MIN: CPT | Performed by: PSYCHIATRY & NEUROLOGY

## 2023-10-10 ASSESSMENT — PAIN SCALES - GENERAL: PAINLEVEL: MILD PAIN (2)

## 2023-10-10 NOTE — PROGRESS NOTES
[facility]    Екатерина Lucero MRN# [id number]   Age: 33 year old YOB: 1990     Requesting physician: No ref. provider found  Geri oLyola            Assessment and Plan:     (G43.251) Intractable chronic migraine without aura and without status migrainosus  (primary encounter diagnosis)  Comment: Continue with plan of adding Emgality and using Ubrelvy prn. She could consider adding Botox in 3 months but avoiding paraspinals.    Most disabling symptom is visual problems with reading. We will explore if an OT can help her adapt to finding reading accomodation to improve quality of life.    Try dry needling first before trigger point injections.    (O46.649) Temporomandibular joint disorder  Comment: Continue with current specialist      (Q79.60) EDS (Harvey-Danlos syndrome)  Comment: Monitor and avoid hyperextension    45 minutes spent in patient visit today.     Shazia Amaya MD             History of Present Illness:   CC: Recheck    Екатерина was seen for the first time on October 6ht as a video visit. We added this appointment today to answer further questions and also to allow for physical examination.    She reports she has already received Emgality approval and it is at the pharmacy. She hasn't injected yet.    She is wondering about trigger point injections, she hasn't tried them in the past.    She is getting PT at two locations. One in the water and the other on land.              Physical Exam:     Resp 16   SpO2 94%     Supine 106/65, 94  Sitting 114/73, 100  Standing 110/78, 119    Gen: Awake, alert, NAD  Neuro: 2-12 intact. I don't appreciate nystagmus but no quick eyes movements performed.  Right shoulder elevated compared to the left, no clear hypermobility with neck range of motion.   She has tight spasm in masseters on gentle palpation.  Sits with good posture.          Pertinent History/Data for appointment:

## 2023-10-10 NOTE — LETTER
10/10/2023       RE: Екатерина Lucero  [address] [address]       Dear Colleague,    Thank you for referring your patient, Екатерина Lucero, to the [facility] NEUROLOGY CLINIC [place] at [facility]. Please see a copy of my visit note below.        [facility]    Екатерина Lucero MRN# [id number]   Age: 33 year old YOB: 1990     Requesting physician: No ref. provider found  Geri Loyola            Assessment and Plan:     (G43.719) Intractable chronic migraine without aura and without status migrainosus  (primary encounter diagnosis)  Comment: Continue with plan of adding Emgality and using Ubrelvy prn. She could consider adding Botox in 3 months but avoiding paraspinals.    Most disabling symptom is visual problems with reading. We will explore if an OT can help her adapt to finding reading accomodation to improve quality of life.    Try dry needling first before trigger point injections.    (M61.237) Temporomandibular joint disorder  Comment: Continue with current specialist      (Q79.60) EDS (Harvey-Danlos syndrome)  Comment: Monitor and avoid hyperextension    45 minutes spent in patient visit today.     Shazia Amaya MD             History of Present Illness:   CC: Recheck    Екатерина was seen for the first time on October 6ht as a video visit. We added this appointment today to answer further questions and also to allow for physical examination.    She reports she has already received Emgality approval and it is at the pharmacy. She hasn't injected yet.    She is wondering about trigger point injections, she hasn't tried them in the past.    She is getting PT at two locations. One in the water and the other on land.            Physical Exam:     Resp 16   SpO2 94%     Supine 106/65, 94  Sitting 114/73, 100  Standing 110/78, 119    Gen: Awake, alert, NAD  Neuro: 2-12 intact. I don't appreciate nystagmus but no  quick eyes movements performed.  Right shoulder elevated compared to the left, no clear hypermobility with neck range of motion.   She has tight spasm in masseters on gentle palpation.  Sits with good posture.          Pertinent History/Data for appointment:       Again, thank you for allowing me to participate in the care of your patient.      Sincerely,    Shazia Amaya MD

## 2023-10-10 NOTE — TELEPHONE ENCOUNTER
"Prior Authorization Retail Medication Request    Medication/Dose: Emgality 240 mg loading dose then 120 mg every 28 days  ICD code (if different than what is on RX):    Previously Tried and Failed:  Acute therapies:  Rizatriptan tried a lot of triptans and don't respond well  Naratriptan   Sumatriptan  Ubrelvy 50 mg she thought it was effective but does not abort migraine  Ubrelvy 100 mg new medicine recently prescribed  Cyclobenzaprine 5 mg prn using 2 times a month  Toradol/Benadryl/Zofran     Chronic therapies:  Gabapentin brief trial not tolerated  Cyclobenzaprine 5 mg at night initially helped for first few days then shot back up. Only using PRN  PT on neck (neck is tense because it is overworking from EDS does not have \"true cervical cranial instability\"  Qulipta helped at first and then wore off. Also caused nausea and fatigue sop ti was stopped  Venlafaxine she tried it at a lower dose and felt nauseous. 37.5 mg dose tried could not tolerate higher  Topiramate 9 not tried history of eating disorder best to avoid. Also has diagnosis of ADHD might make that worse  Nurtec 75 every other day just received hasn't started. Was supposed to be used with Ubrelvy?  Was also given Namenda 5 mg but hasn't started  Low dose Naltrexone started for PCOS and autoimmune stuff. No effect on headache  Neurivio for prevention and acute. Using helps a little  Magnesium oxide no help     Rationale:   Daily Most of the time moderate, Severe attacks one to 3 days a week  Quality: Feels like her brain is jiggling in her head.     Insurance Name:  [employer] [place]  Insurance ID:  [id number]      Pharmacy Information (if different than what is on RX)  Name:    Phone:     "

## 2023-10-11 NOTE — TELEPHONE ENCOUNTER
FUTURE VISIT INFORMATION:        FUTURE VISIT INFORMATION:  Date: 11/8/23  Time: 3:45 PM   [place] [facility]  REFERRAL INFORMATION:  Referring provider: Dr. Alejo Gonzales  Referring providers clinic: [facility]  Reason for visit/diagnosis:  macroglossia of the base of tongue. further assessment and possible workup for transoral robotic base of tongue reduction surgical intervention - Referred by Dr. Alejo Gonzales @ [facility]     RECORDS REQUESTED FROM:         Clinic name Comments Records Status Imaging Status   [facility] 9/16/22 OV note- Dr. Alejo Gonzales Scanned in [facility]   [facility] MRN: [id number] MR Cervical 08/07/23  MR Brain 08/07/23  MR TMJ [id number]  send to scan 8/15/23 Pending req 8/15/23   PACS

## 2023-10-12 NOTE — TELEPHONE ENCOUNTER
Prior Authorization Not Needed per Insurance    Medication: EMGALITY 120 MG/ML SC SOAJ  Insurance Company: [employer] - Phone [phone] Fax [phone]  Expected CoPay: $    Pharmacy Filling the Rx: [facility] -- [place], [place] - [address]  Pharmacy Notified: YES  Patient Notified:  **Instructed pharmacy to notify patient when script is ready to /ship.**    ALREADY APPROVED VIA EPA

## 2023-10-19 NOTE — PATIENT INSTRUCTIONS
"-----------------------------------------------------------------------------  Dr. Carpenter's comments about Hypermobility and  Harvey-Danlos syndrome (EDS)                          -----------------------------------------------------------------------------         Harvey-Danlos syndrome (EDS) is a term used for a collection of inborn conditions that may not be closely related, but all have hypermobility of joints and some other tissues (\"Hypermobility\" means that the joint or connective tissue is more flexible or stretchy than for other people).       Hypermobility is traditionally assessed by the Beighton score, but most providers with EDS experience will allow some leeway for partial hypermobility because the Beighton score only accounts for 8 joints plus a single type of bending for the spine.          In years past, the EDS diagnosis was given to lots of people with mild joint hypermobility but no other symptoms. In 2017, experts rewrote guidelines for terms so that hypermobile-type EDS (hEDS) would only be used for patients with several other findings besides joint hypermobility. Criteria can be found by doing an Internet search for \"hypermobile Harvey Danlos criteria.\"       The 2017 criteria can be a bit frustrating because they vastly restrict the EDS term for patients who were previously referred to as having EDS. In fact, the criteria are seemingly designed such that it is very difficult for youth without obvious malformations to qualify. Instead, we now use these terms:       - \"Hypermobility Syndrome\" is the term for pain symptoms AND            either global hypermobility OR partial hypermobility plus certain            additional features.        - \"Joint hypermobility\" or \"hypermobile joint(s)\" is the term for            hypermobility without joint symptoms. These people may             have other conditions, however.    Of course, the official label is not terribly important because the treatment " "approach for hypermobile EDS and hypermobility syndrome is essentially the same: strengthen the areas around problem joints, and be aware of symptoms and syndromes that may develop.         Technically, EDS and hypermobility should not be regarded as the \"cause\" of pain, but rather a variant in connective tissue that increases the risk for certain types of pain or dysautonomia. Anybody with hypermobility should have their joints assessed, and ensure that they have enough strength around the problem joints to prevent future problems.     What should I do, about my hypermobility?  I have several suggestions:      -- Most important: work on muscles around your problem joints. Pay special attention to your lower back and knees. Muscles will help your joints to stay together. You may want to work with a physical therapist or  - but make sure they know about hypermobility.      -- Subluxing or even dislocations may occur. You may be able to get these back in on your own. If the joint stays stuck, then seek medical help.      -- Warm up carefully before physical activity.      -- Keep active physically. For some reason, hypermobility-associated pain gets worse with inactivity. If activity makes you hurt, talk with me about finding the \"sweet spot\" between overuse and under-use.      -- Please don't show off your \"hypermobility party tricks.\"      -- What about joint cracking?  Never crack your neck intentionally (this can lead to pinched nerves, numbness, or pain). Try to avoid cracking your other joints.      -- For back strength, I recommend: swim laps at a gentle speed, 20-30 minutes, 3-4 times per week. Use a snorkel, don't worry about swimming form. The point is to be horizontal in the water, and move forward slowly. If you have autonomic symptoms (dizziness, heart-racing, etc), you will need additional exercise besides swimming.      -- Work on posture, by (a) increasing abdominal muscle tone, and (b) " "try holding your elbows parallel to your body.      -- What about braces?  Train without braces or tapes as much as possible. Avoid braces or taping except (a) when injured, (b) when doing unusually strenuous activity (e.g. trip to [place]), or (c) for the second half of moderate activity that lasts a long time (hiking).       -- Ice or heat? In general, ice is good for inflammation or injury in the past 24 hours. Heat is only good for tight muscles. Heat will not help most pain in your joints themselves.      -- What about anti-inflammatory medications?  Talk with me about this. Ibuprofen or naproxen can reduce pain after injury. Ideally, you should avoid taking them regularly (they lose effectiveness over time). If you have daily pain, your primary care provider will recommend a different approach.      -- Headache medicines? If you have headaches, check with me for a diagnosis and medicine options. Ibuprofen or naproxen shouldn't be needed for headaches more often than twice per month. Acetaminophen shouldn't be needed for headaches more often than twice per week.      -- Read \"The Hypermobility Handbook\" by Dr. Jaime Alvarado (just keep in mind that the entire book may not apply to you).  A variety of autonomic symptoms can occur in people with hypermobility, although technically they are not a result of the hypermobility itself. if you have other symptoms not related to joint or muscle problems, check with your health care providers.      Answers to some other questions       What about heart or aorta complications?The risk for cardiovascular disease remains incompletely defined in hypermobile EDS, but is not thought to be predictably elevated compared to the general population. Some patients will have valve or aortic abnormalities, but these patients may turn out to be qualitatively different than their other hypermobile peers. My personal recommendation about this is to (a) keep a longitudinal relationship with a " primary care provider who is competent at detecting new murmurs, (b) through age 40 at least, have an annual well exam to ensure that murmurs are assessed, (c) have a heart exam when being evaluated for any illnesses or health problems, and (d) have an evaluation by a cardiologist and/or echocardiogram for murmurs or other unexplainable changes in exam.         Aren't there genetic blood tests for EDS? Technically there are some tests that can be done, but these tests are not very sensitive (many people with EDS or hypermobility are missed by the tests). Also, a positive or negative test doesn't affect our management. I was part of a group that was planning a much broader research program with hypermobility, but unfortunately federal funding for all research was cut and the project was stopped.         Should I see a ? You are welcome to see a  if you wish, but the geneticists don't do anything different than what I do. Also, the geneticists are a lot harder to see! The key thing is to be assessed by somebody with lots of EDS and hypermobility experience. I highly recommend that you establish with an EDS / hypermobility center close to your home, so that you can have ongoing care at an organization that knows you and your history. An important component of such a center is either Physical Therapy or Physical Medicine and Rehabilitation (PM&R).    Useful medical references regarding Harvey-Danlos Syndrome   An overview of the different types of can be found at https://www.Tripbirds.com/eds-types/  Criteria for hypermobile type EDS (hEDS) can be found at https://[email or web address]-[email or web address]-[email or web address]-[email or web address]-[email or web address]  (the main reference for criteria is Mikki beltre al, Am J Med Wilma 2017, 175C:8-26)  For a nice article about hEDS versus other hypermobility conditions, see Heather et al, Am J Med Wilma 2017, 175c:148-157.

## 2023-10-23 ENCOUNTER — PRE VISIT (OUTPATIENT)
Dept: NEUROLOGY | Facility: CLINIC | Age: 33
End: 2023-10-23

## 2023-11-08 ENCOUNTER — PRE VISIT (OUTPATIENT)
Dept: OTOLARYNGOLOGY | Facility: CLINIC | Age: 33
End: 2023-11-08

## 2023-12-04 ENCOUNTER — TELEPHONE (OUTPATIENT)
Dept: NEUROLOGY | Facility: CLINIC | Age: 33
End: 2023-12-04
Payer: COMMERCIAL

## 2023-12-04 NOTE — TELEPHONE ENCOUNTER
"Prior Authorization Retail Medication Request    Medication/Dose: Emgality 240 mg loading dose then 120 mg every 28 days  Diagnosis and ICD code (if different than what is on RX):    New/renewal/insurance change PA/secondary ins. PA:  Previously Tried and Failed:   Acute therapies:  Rizatriptan tried a lot of triptans and don't respond well  Naratriptan   Sumatriptan  Ubrelvy 50 mg she thought it was effective but does not abort migraine  Ubrelvy 100 mg new medicine recently prescribed  Cyclobenzaprine 5 mg prn using 2 times a month  Toradol/Benadryl/Zofran     Chronic therapies:  Gabapentin brief trial not tolerated  Cyclobenzaprine 5 mg at night initially helped for first few days then shot back up. Only using PRN  PT on neck (neck is tense because it is overworking from EDS does not have \"true cervical cranial instability\"  Qulipta helped at first and then wore off. Also caused nausea and fatigue sop ti was stopped  Venlafaxine she tried it at a lower dose and felt nauseous. 37.5 mg dose tried could not tolerate higher  Topiramate 9 not tried history of eating disorder best to avoid. Also has diagnosis of ADHD might make that worse  Nurtec 75 every other day just received hasn't started. Was supposed to be used with Ubrelvy?  Was also given Namenda 5 mg but hasn't started  Low dose Naltrexone started for PCOS and autoimmune stuff. No effect on headache  Neurivio for prevention and acute. Using helps a little  Magnesium oxide no help     Rationale:   Daily Most of the time moderate, Severe attacks one to 3 days a week  Quality: Feels like her brain is jiggling in her head.      Insurance Name:  [employer]  Insurance ID:  [id number]       Pharmacy Information (if different than what is on RX)  Name:    Phone:    Fax:   "

## 2023-12-06 ENCOUNTER — OFFICE VISIT (OUTPATIENT)
Dept: OTOLARYNGOLOGY | Facility: CLINIC | Age: 33
End: 2023-12-06
Attending: OTOLARYNGOLOGY
Payer: COMMERCIAL

## 2023-12-06 VITALS
OXYGEN SATURATION: 98 % | HEART RATE: 90 BPM | DIASTOLIC BLOOD PRESSURE: 73 MMHG | TEMPERATURE: 97.9 F | SYSTOLIC BLOOD PRESSURE: 117 MMHG | BODY MASS INDEX: 29.7 KG/M2 | WEIGHT: 173 LBS

## 2023-12-06 DIAGNOSIS — Q38.2 MACROGLOSSIA: Primary | ICD-10-CM

## 2023-12-06 PROCEDURE — 31575 DIAGNOSTIC LARYNGOSCOPY: CPT | Performed by: OTOLARYNGOLOGY

## 2023-12-06 PROCEDURE — 99203 OFFICE O/P NEW LOW 30 MIN: CPT | Mod: 25 | Performed by: OTOLARYNGOLOGY

## 2023-12-06 ASSESSMENT — PAIN SCALES - GENERAL: PAINLEVEL: MILD PAIN (3)

## 2023-12-06 NOTE — NURSING NOTE
Chief Complaint   Patient presents with    Consult     Macroglossia  at base of tongue        Frandy Wood LPN

## 2023-12-06 NOTE — LETTER
12/6/2023       RE: Екатерина Lucero  [address] [facility] [address]     Dear Colleague,    Thank you for referring your patient, Екатерина Lucero, to the [facility] [place] at [facility]. Please see a copy of my visit note below.    History of present illness: The patient is a 33-year-old woman who expresses a sense of tightness around the level of the hyoid and infrahyoid musculature in her neck.  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  She was seen by Dr. Fontana who was concerned about the size of her tongue base and thought it may be contributing to her throat symptoms.  She denies dysphagia, dyne aphasia, otalgia, hemoptysis.  He does not have frequent throat clearing.  She thinks she may snore a little bit but has had a sleep study which did not reveal any obvious sleep apnea.        Past Medical History:   Diagnosis Date     ADD (attention deficit disorder)     Not on medication     Chronic daily headache      Depression with anxiety      Fractures     R foot, L hand,R hand -- gymnastics     Gluten intolerance      History of eating disorder     bulimia, in remission     Hypermobile joints      PCOS (polycystic ovarian syndrome)      Sjogren's syndrome without extraglandular involvement (H24)      Past Surgical History:   Procedure Laterality Date     ADENOIDECTOMY  1994     BIOPSY OF SKIN LESION         Current Outpatient Medications:      cyclobenzaprine (FLEXERIL) 5 MG tablet, Take 1 tablet (5 mg) by mouth at bedtime, Disp: 30 tablet, Rfl: 11     diphenhydrAMINE (BENADRYL) 50 MG capsule, Take 1 capsule (50 mg) by mouth every 6 hours as needed for other (take with metoclopramide), Disp: , Rfl:      galcanezumab-gnlm (EMGALITY) 120 MG/ML injection, Inject 1 mL (120  mg) Subcutaneous every 28 days MAINTENANCE DOSE, Disp: 1 mL, Rfl: 11     ID NOW COVID-19 KIT, , Disp: , Rfl:      Lactic Ac-Citric Ac-Pot Bitart (PHEXXI) 1.8-1-0.4 % GEL, Place 1 applicator vaginally, Disp: , Rfl:      metoclopramide (REGLAN) 10 MG tablet, , Disp: , Rfl:      naltrexone (REVIA) 1 mg/mL SOLN, Take 4.5 mg by mouth once, Disp: , Rfl:      ondansetron (ZOFRAN ODT) 4 MG ODT tab, Take 1 tablet by mouth every 8 hours as needed, Disp: , Rfl:      promethazine (PHENERGAN) 25 MG tablet, , Disp: , Rfl:      ubrogepant (UBRELVY) 100 MG tablet, Take 100 mg by mouth at onset of headache (migraine), Disp: , Rfl:      Vitamin D, Cholecalciferol, 25 MCG (1000 UT) TABS, Take 2,000 Units by mouth, Disp: , Rfl:   Allergies   Allergen Reactions     Azithromycin Hives     Food      PN: strawberries, kiwi, grapefruit     Gluten Meal Other (See Comments)     Other Reaction(s): Other (see comments)    Told to refrain from gluten     Penicillins Other (See Comments)     Other Reaction(s): Not available     Succimer Rash     Social History     Tobacco Use     Smoking status: Never     Smokeless tobacco: Never   Substance Use Topics     Alcohol use: Yes     Comment: socially, small amounts     Review Of Systems  Skin: negative  Eyes: negative  Ears/Nose/Throat: negative  Respiratory: No shortness of breath, dyspnea on exertion, cough, or hemoptysis  Cardiovascular: negative  Gastrointestinal: negative  Genitourinary: negative  Musculoskeletal: negative  Neurologic: negative  Psychiatric: negative  Hematologic/Lymphatic/Immunologic: negative  Endocrine: negative    Physical examination: On examination, she is well-appearing and in no distress.  Examination of the ears reveals she has slightly retracted tympanic membranes bilaterally but I do not any obvious fluid in her middle ear spaces.  Examination of the oral cavity reveals normal mucosa of the tongue, floor mouth, hard and soft palate, gingiva and buccal mucosa, and  posterior pharyngeal wall.  She cannot tolerate mirror exam.  Examination of the neck reveals no lymphadenopathy, normal laryngeal mobility and normal parotid glands.    Flexible fiberoptic endoscopy performed through the right nasal cavity without anesthesia per patient request, reveals a normal nasopharynx.  When I look in the oropharynx, tongue base is modestly enlarged but not overtly so on today's exam.  When she protrudes her tongue, the vallecula opens.  The hypopharynx larynx is normal with normal vocal mobility.    Impression: Insufficient tongue base volume on today's examination to warrant surgical intervention    Plan:  1.  I counseled the patient that I would not recommend any tongue base reduction based on what I see today.    Tomi Cabello M.D.

## 2023-12-06 NOTE — PATIENT INSTRUCTIONS
1. Please follow-up in clinic as needed.   2. Please call the ENT clinic with any questions,concerns, new or worsening symptoms.    -Clinic number is [phone]   - Mary's direct line (Dr. Mccall's nurse) [phone]

## 2023-12-06 NOTE — TELEPHONE ENCOUNTER
PA Initiation    Medication: EMGALITY 120 MG/ML SC SOAJ  Insurance Company: [employer] - Phone [phone] Fax [phone]  Pharmacy Filling the Rx: [facility] -- [place], [place] - [address]  Filling Pharmacy Phone: [phone]  Filling Pharmacy Fax: [phone]  Start Date: 12/6/2023

## 2023-12-07 NOTE — PROGRESS NOTES
History of present illness: The patient is a 33-year-old woman who expresses a sense of tightness around the level of the hyoid and infrahyoid musculature in her neck.  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  She was seen by Dr. Fontana who was concerned about the size of her tongue base and thought it may be contributing to her throat symptoms.  She denies dysphagia, dyne aphasia, otalgia, hemoptysis.  He does not have frequent throat clearing.  She thinks she may snore a little bit but has had a sleep study which did not reveal any obvious sleep apnea.        Past Medical History:   Diagnosis Date    ADD (attention deficit disorder)     Not on medication    Chronic daily headache     Depression with anxiety     Fractures     R foot, L hand,R hand -- gymnastics    Gluten intolerance     History of eating disorder     bulimia, in remission    Hypermobile joints     PCOS (polycystic ovarian syndrome)     Sjogren's syndrome without extraglandular involvement (H24)      Past Surgical History:   Procedure Laterality Date    ADENOIDECTOMY  1994    BIOPSY OF SKIN LESION         Current Outpatient Medications:     cyclobenzaprine (FLEXERIL) 5 MG tablet, Take 1 tablet (5 mg) by mouth at bedtime, Disp: 30 tablet, Rfl: 11    diphenhydrAMINE (BENADRYL) 50 MG capsule, Take 1 capsule (50 mg) by mouth every 6 hours as needed for other (take with metoclopramide), Disp: , Rfl:     galcanezumab-gnlm (EMGALITY) 120 MG/ML injection, Inject 1 mL (120 mg) Subcutaneous every 28 days MAINTENANCE DOSE, Disp: 1 mL, Rfl: 11    ID NOW COVID-19 KIT, , Disp: , Rfl:     Lactic Ac-Citric Ac-Pot Bitart (PHEXXI) 1.8-1-0.4 % GEL, Place 1 applicator vaginally, Disp: , Rfl:     metoclopramide (REGLAN) 10 MG tablet, , Disp: , Rfl:     naltrexone (REVIA) 1 mg/mL SOLN, Take 4.5 mg by mouth once, Disp: ,  Rfl:     ondansetron (ZOFRAN ODT) 4 MG ODT tab, Take 1 tablet by mouth every 8 hours as needed, Disp: , Rfl:     promethazine (PHENERGAN) 25 MG tablet, , Disp: , Rfl:     ubrogepant (UBRELVY) 100 MG tablet, Take 100 mg by mouth at onset of headache (migraine), Disp: , Rfl:     Vitamin D, Cholecalciferol, 25 MCG (1000 UT) TABS, Take 2,000 Units by mouth, Disp: , Rfl:   Allergies   Allergen Reactions    Azithromycin Hives    Food      PN: strawberries, kiwi, grapefruit    Gluten Meal Other (See Comments)     Other Reaction(s): Other (see comments)    Told to refrain from gluten    Penicillins Other (See Comments)     Other Reaction(s): Not available    Succimer Rash     Social History     Tobacco Use    Smoking status: Never    Smokeless tobacco: Never   Substance Use Topics    Alcohol use: Yes     Comment: socially, small amounts     Review Of Systems  Skin: negative  Eyes: negative  Ears/Nose/Throat: negative  Respiratory: No shortness of breath, dyspnea on exertion, cough, or hemoptysis  Cardiovascular: negative  Gastrointestinal: negative  Genitourinary: negative  Musculoskeletal: negative  Neurologic: negative  Psychiatric: negative  Hematologic/Lymphatic/Immunologic: negative  Endocrine: negative    Physical examination: On examination, she is well-appearing and in no distress.  Examination of the ears reveals she has slightly retracted tympanic membranes bilaterally but I do not any obvious fluid in her middle ear spaces.  Examination of the oral cavity reveals normal mucosa of the tongue, floor mouth, hard and soft palate, gingiva and buccal mucosa, and posterior pharyngeal wall.  She cannot tolerate mirror exam.  Examination of the neck reveals no lymphadenopathy, normal laryngeal mobility and normal parotid glands.    Flexible fiberoptic endoscopy performed through the right nasal cavity without anesthesia per patient request, reveals a normal nasopharynx.  When I look in the oropharynx, tongue base is  modestly enlarged but not overtly so on today's exam.  When she protrudes her tongue, the vallecula opens.  The hypopharynx larynx is normal with normal vocal mobility.    Impression: Insufficient tongue base volume on today's examination to warrant surgical intervention    Plan:  1.  I counseled the patient that I would not recommend any tongue base reduction based on what I see today.    Tomi Cabello M.D.

## 2023-12-07 NOTE — TELEPHONE ENCOUNTER
Prior Authorization Approval    Medication: EMGALITY 120 MG/ML SC SOAJ  Authorization Effective Date: 9/8/2023  Authorization Expiration Date: 1/7/2024  Approved Dose/Quantity:   Reference #:     Insurance Company: [employer] - Phone [phone] Fax [phone]  Expected CoPay: $    CoPay Card Available:      Financial Assistance Needed:   Which Pharmacy is filling the prescription: [facility] -- [place], [place] - [address]  Pharmacy Notified: YES  Patient Notified: **Instructed pharmacy to notify patient when script is ready to /ship.**

## 2023-12-13 ENCOUNTER — OFFICE VISIT (OUTPATIENT)
Dept: INTERNAL MEDICINE | Facility: CLINIC | Age: 33
End: 2023-12-13
Payer: COMMERCIAL

## 2023-12-13 VITALS — OXYGEN SATURATION: 99 % | DIASTOLIC BLOOD PRESSURE: 74 MMHG | SYSTOLIC BLOOD PRESSURE: 109 MMHG | HEART RATE: 116 BPM

## 2023-12-13 DIAGNOSIS — Q79.62 HYPERMOBILE EHLERS-DANLOS SYNDROME: ICD-10-CM

## 2023-12-13 DIAGNOSIS — Z11.4 SCREENING FOR HIV (HUMAN IMMUNODEFICIENCY VIRUS): Primary | ICD-10-CM

## 2023-12-13 DIAGNOSIS — Z11.59 NEED FOR HEPATITIS C SCREENING TEST: ICD-10-CM

## 2023-12-13 PROCEDURE — 99214 OFFICE O/P EST MOD 30 MIN: CPT | Performed by: PEDIATRICS

## 2023-12-13 NOTE — PROGRESS NOTES
"Dear patient. Thank you for visiting with me. I want you to feel respected, understood, and empowered. \"Respect\" is valuing you as much as I would a close family member. \"Empowerment\" happens when you are fully informed, and can make the best possible decision for you.  Please ask me questions!  Challenge anything that is not clear.    Medical records are primarily used as memory aids for me and my colleagues. Things to know about my documentation style:  - The 'problem list' includes current symptoms or diagnoses, and some problems that are resolved but may return. I use the past medical history for problems not expected to return.  - I use single quotation marks for things that you or I said, when I want to clarify who was speaking.  - I use double quotation marks when copying a term from elsewhere in your records. Italics (besides here) may also denote a quotation.  If you have questions or concerns, please contact me; I will reply as soon as time allows.    Subjective     Екатерина Lucero is a 33 year old  woman , with concerns including:  Chief Complaint   Patient presents with    Follow Up     Pt here for EDS follow up     PCP: Geri Loyola   Visit type: problem-oriented    Disposition comment: It was nice to see Екатерина Lucero again. She had her EDS diagnosis consult by Dr. Sung 9/30/22, and I saw her for some additional questions on 9/22/23. She asked the schedulers if she could switch primary care to me. Unfortunately I have very little bandwidth for complex patients now, so it is best that she stay with my partner Geri Loyola, in whom I have full confidence. Also I see that Екатерина has relationships with some other PCPs. It is best to have just one.      Neck pain   - Chronic pain since 2016   - Continued weekly PT with minimal results   - Follows with Dr. Amaya for migraines, no botox   - Spine consult   - Recently started swimming with snorkel 6x a week   - Bed is soft, sleeps on side with " lots of support and specialty pillow   - Uses cyclobenzaprine with adequate response, denied refill   - Endorsed migraine with aura when using estrogen   - Discussed possible benefice of discontinuing menstrual period with progestin alone   - Hx of nuvaring with worsening depression   - Endorsed PCOS with labile/elevated LH, no spironolactone due to vestibular migraines  - Started pilates with Shazia Andino     Bracing:   - [place] foot eval at [facility]  - Full cast made for chronic right foot fracture   - Followed up with orthopedics at [facility], opted against injection   - Discussed PRP   - Requested a note benefiting from bracing for [facility], due to last visit with Dr. Sung was virtual   - Currently participating in PT           Objective     /74 (BP Location: Right arm, Patient Position: Sitting, Cuff Size: Adult Regular)   Pulse 116   SpO2 99%     Physical Exam     - C spine MRI 8/15/23 showed shallow disc-protrusion at c5/6 and C6/7 with flattening of ventral cord without central stenosis or impingement   - Formaital at T1/T2 into high thoracic     Assessment & Plan       Neck pain  - 33 year old woman with EDS associated neck pain. I would consider this and it is probably good to have regular video follow ups to fine tune. I continue to have full confidence in my colleagues to continue her care. Ultimately, finding the correct drug cocktail as she moves into her later 30 s for adequate symptom management will be imperative.       - Continue pool therapy   - Positional/supportive positional changes for sleep .cat     Bracing  - 33 year old woman with EDS and with need to be seen by specialist to address EDS related hypermobility     - [facility] follow up         About this visit:  Time note (e4, 30'): The total time (on the date of service) for this service was 38 minutes, including discussion/face-to-face, chart review, interpretation not otherwise reported, documentation, and updating of the  computerized record.      Scribe Disclosure:   I, Yonis Kim, am serving as a scribe; to document services personally performed by Dr. Socrates Carpenter- -based on data collection and the provider's statements to me.     Provider Disclosure:  I agree with above History, Review of Systems, Physical exam and Plan.  I have reviewed the content of the documentation and have edited it as needed. I have personally performed the services documented here and the documentation accurately represents those services and the decisions I have made.      Electronically signed by:  Dr. Socrates Carpenter

## 2023-12-13 NOTE — PROGRESS NOTES
Екатерина is a 33 year old that presents in clinic today for the following:     Chief Complaint   Patient presents with    Follow Up     Pt here for EDS follow up           12/13/2023     9:25 AM   Additional Questions   Roomed by MJL, EMT       Screenings from encounters over the past 10 days    No data recorded       Lukasz Celestin at 9:27 AM on 12/13/2023

## 2023-12-19 ENCOUNTER — MYC MEDICAL ADVICE (OUTPATIENT)
Dept: NEUROLOGY | Facility: CLINIC | Age: 33
End: 2023-12-19
Payer: COMMERCIAL

## 2023-12-28 ENCOUNTER — MEDICAL CORRESPONDENCE (OUTPATIENT)
Dept: NEUROLOGY | Facility: CLINIC | Age: 33
End: 2023-12-28
Payer: COMMERCIAL

## 2024-01-15 ENCOUNTER — VIRTUAL VISIT (OUTPATIENT)
Dept: NEUROLOGY | Facility: CLINIC | Age: 34
End: 2024-01-15
Payer: COMMERCIAL

## 2024-01-15 DIAGNOSIS — G89.4 CHRONIC PAIN DISORDER: ICD-10-CM

## 2024-01-15 DIAGNOSIS — G43.709 CHRONIC MIGRAINE WITHOUT AURA WITHOUT STATUS MIGRAINOSUS, NOT INTRACTABLE: ICD-10-CM

## 2024-01-15 DIAGNOSIS — Q79.60 EDS (EHLERS-DANLOS SYNDROME): ICD-10-CM

## 2024-01-15 DIAGNOSIS — T75.3XXA MOTION SICKNESS, INITIAL ENCOUNTER: Primary | ICD-10-CM

## 2024-01-15 PROCEDURE — 99214 OFFICE O/P EST MOD 30 MIN: CPT | Mod: 95 | Performed by: PSYCHIATRY & NEUROLOGY

## 2024-01-15 RX ORDER — SCOLOPAMINE TRANSDERMAL SYSTEM 1 MG/1
1 PATCH, EXTENDED RELEASE TRANSDERMAL
Qty: 5 PATCH | Refills: 3 | Status: SHIPPED | OUTPATIENT
Start: 2024-01-15 | End: 2024-07-05

## 2024-01-15 ASSESSMENT — MIGRAINE DISABILITY ASSESSMENT (MIDAS)
HOW MANY DAYS DID YOU NOT DO HOUSEWORK BECAUSE OF HEADACHES: 30
ON A SCALE FROM 0-10 ON AVERAGE HOW PAINFUL WERE HEADACHES: 6
TOTAL SCORE: 122
HOW MANY DAYS WAS YOUR PRODUCTIVITY CUT IN HALF BECAUSE OF HEADACHES: 30
HOW MANY DAYS WAS HOUSEWORK PRODUCTIVITY CUT IN HALF DUE TO HEADACHES: 30
HOW MANY DAYS DID YOU MISS WORK OR SCHOOL BECAUSE OF HEADACHES: 30
HOW OFTEN WERE SOCIAL ACTIVITIES MISSED DUE TO HEADACHES: 2
HOW MANY DAYS IN THE PAST 3 MONTHS HAVE YOU HAD A HEADACHE: 80

## 2024-01-15 ASSESSMENT — HEADACHE IMPACT TEST (HIT 6)
WHEN YOU HAVE HEADACHES HOW OFTEN IS THE PAIN SEVERE: SOMETIMES
HOW OFTEN DID HEADACHS LIMIT CONCENTRATION ON WORK OR DAILY ACTIVITY: VERY OFTEN
WHEN YOU HAVE A HEADACHE HOW OFTEN DO YOU WISH YOU COULD LIE DOWN: ALWAYS
HOW OFTEN DO HEADACHES LIMIT YOUR DAILY ACTIVITIES: VERY OFTEN
HOW OFTEN HAVE YOU FELT FED UP OR IRRITATED BECAUSE OF YOUR HEADACHES: VERY OFTEN
HIT6 TOTAL SCORE: 66
HOW OFTEN HAVE YOU FELT TOO TIRED TO WORK BECAUSE OF YOUR HEADACHES: SOMETIMES

## 2024-01-15 NOTE — NURSING NOTE
Is the patient currently in the state of [place]? YES    Visit mode:VIDEO    If the visit is dropped, the patient can be reconnected by: TELEPHONE VISIT: Phone number:   Telephone Information:   Mobile [phone]       Will anyone else be joining the visit? NO  (If patient encounters technical issues they should call [phone]    How would you like to obtain your AVS? MyChart    Are changes needed to the allergy or medication list? Pt stated no med changes    Reason for visit: Video Visit    Arlene OROZCO

## 2024-01-15 NOTE — LETTER
1/15/2024       RE: Екатерина Lucero  [address] [address]       Dear Colleague,    Thank you for referring your patient, Екатерина Lucero, to the [facility]. Please see a copy of my visit note below.      Neurology Progress Note    [facility]    Екатерина Lucero MRN# [id number]   Age: 33 year old YOB: 1990     PCP: Geri Loyola            Assessment and Plan:     Chronic Migraine  EDS  Chronic pain    Monitor for now, without Emgality.   Continue Ubrelvy.   I will ask Dee Trent  about vocational rehab resources. The patient wants to try and find some work to keep her brain occupied.     Motion Sickness  Provided scoploamine patch for her to use PRN.     Follow up in 5 months.  30 minutes spent caring for the patient on the day of the visit including visit time, chart review and contacting social work.     Shazia Amaya MD           History of Present Illness:   CC: Recheck    Екатерина is a 33 year old woman with a severe constellation of symptoms including headache, EDS,   She had COVID. Actually made her feel better with headaches, not worse.   She is doing better overall than when we first meet. Stopping Qulipta helped her feel better. Because of this she didn't start Emgality. She still battles daily headache pain but not as severe.     She is working out every day. Swimming and biking.   At night she uses cyclobenzaprine and low dose naltrexone  Using Ubrelvy 100 mg prn. Luteal phase 4 times a week, during period similar. Rest of month once a week. So far she has adequate pills in her prescription.    She is trying not to avoid moments in her life like reading that bring her servando but sometimes make pain worse.     She has PCOS and it has gotten worse recently. Considering starting metformin. Knows it could make headaches worse. She also has gained weight, no  reason why.     Current MIDAS score 122 (grade IV)         Physical Exam:              Pertinent History/Data for appointment:         Again, thank you for allowing me to participate in the care of your patient.      Sincerely,    Shazia Amaya MD

## 2024-01-15 NOTE — PROGRESS NOTES
Virtual Visit Details    Type of service:  Video Visit     Originating Location (pt. Location): Home    Distant Location (provider location):  Off-site  Platform used for Video Visit: [facility]    Neurology Progress Note    [facility]    Екатерина Lucero MRN# [id number]   Age: 33 year old YOB: 1990     PCP: Geri Loyola            Assessment and Plan:     Chronic Migraine  EDS  Chronic pain    Monitor for now, without Emgality.   Continue Ubrelvy.   I will ask Dee Trent  about vocational rehab resources. The patient wants to try and find some work to keep her brain occupied.     Motion Sickness  Provided scoploamine patch for her to use PRN.     Follow up in 5 months.  30 minutes spent caring for the patient on the day of the visit including visit time, chart review and contacting social work.     Shazia Amaya MD           History of Present Illness:   CC: Recheck    Екатерина is a 33 year old woman with a severe constellation of symptoms including headache, EDS,   She had COVID. Actually made her feel better with headaches, not worse.   She is doing better overall than when we first meet. Stopping Qulipta helped her feel better. Because of this she didn't start Emgality. She still battles daily headache pain but not as severe.     She is working out every day. Swimming and biking.   At night she uses cyclobenzaprine and low dose naltrexone  Using Ubrelvy 100 mg prn. Luteal phase 4 times a week, during period similar. Rest of month once a week. So far she has adequate pills in her prescription.    She is trying not to avoid moments in her life like reading that bring her servando but sometimes make pain worse.     She has PCOS and it has gotten worse recently. Considering starting metformin. Knows it could make headaches worse. She also has gained weight, no reason why.     Current MIDAS score 122 (grade IV)         Physical Exam:              Pertinent History/Data for appointment:

## 2024-01-19 ENCOUNTER — TELEPHONE (OUTPATIENT)
Dept: NEUROLOGY | Facility: CLINIC | Age: 34
End: 2024-01-19
Payer: COMMERCIAL

## 2024-01-23 ENCOUNTER — TELEPHONE (OUTPATIENT)
Dept: NEUROLOGY | Facility: CLINIC | Age: 34
End: 2024-01-23
Payer: COMMERCIAL

## 2024-02-27 ENCOUNTER — MYC MEDICAL ADVICE (OUTPATIENT)
Dept: NEUROLOGY | Facility: CLINIC | Age: 34
End: 2024-02-27
Payer: COMMERCIAL

## 2024-03-08 ENCOUNTER — MYC MEDICAL ADVICE (OUTPATIENT)
Dept: NEUROLOGY | Facility: CLINIC | Age: 34
End: 2024-03-08
Payer: COMMERCIAL

## 2024-03-08 DIAGNOSIS — G43.709 CHRONIC MIGRAINE WITHOUT AURA WITHOUT STATUS MIGRAINOSUS, NOT INTRACTABLE: Primary | ICD-10-CM

## 2024-03-12 ENCOUNTER — TELEPHONE (OUTPATIENT)
Dept: NEUROLOGY | Facility: CLINIC | Age: 34
End: 2024-03-12
Payer: COMMERCIAL

## 2024-03-12 NOTE — TELEPHONE ENCOUNTER
"Prior Authorization Retail Medication Request    Medication/Dose: Emgality 120 mg every 28 days  Diagnosis and ICD code (if different than what is on RX):    New/renewal/insurance change PA/secondary ins. PA:  Previously Tried and Failed:   Acute therapies:  Rizatriptan tried a lot of triptans and don't respond well  Naratriptan   Sumatriptan  Ubrelvy 50 mg she thought it was effective but does not abort migraine  Ubrelvy 100 mg new medicine recently prescribed  Cyclobenzaprine 5 mg prn using 2 times a month  Toradol/Benadryl/Zofran     Chronic therapies:  Gabapentin brief trial not tolerated  Cyclobenzaprine 5 mg at night initially helped for first few days then shot back up. Only using PRN  PT on neck (neck is tense because it is overworking from EDS does not have \"true cervical cranial instability\"  Qulipta helped at first and then wore off. Also caused nausea and fatigue sop ti was stopped  Venlafaxine she tried it at a lower dose and felt nauseous. 37.5 mg dose tried could not tolerate higher  Topiramate 9 not tried history of eating disorder best to avoid. Also has diagnosis of ADHD might make that worse  Nurtec 75 every other day just received hasn't started. Was supposed to be used with Ubrelvy?  Was also given Namenda 5 mg but hasn't started  Low dose Naltrexone started for PCOS and autoimmune stuff. No effect on headache  Neurivio for prevention and acute. Using helps a little  Magnesium oxide no help     Rationale:   Daily Most of the time moderate, Severe attacks one to 3 days a week  Quality: Feels like her brain is jiggling in her head.      Insurance Name:  [employer] [place]  Insurance ID:  [id number]  "

## 2024-03-22 NOTE — TELEPHONE ENCOUNTER
Central Prior Authorization Team   Phone: [phone]    PA Initiation    Medication: Emgality 120 mg every 28 days  Insurance Company: [employer] Plus PMA - Phone [phone] Fax [phone]  Pharmacy Filling the Rx: [facility] -- [place], [place] - [address]  Filling Pharmacy Phone: [phone]  Filling Pharmacy Fax:    Start Date: 3/22/2024

## 2024-03-25 NOTE — TELEPHONE ENCOUNTER
Prior Authorization Approval    Authorization Effective Date: 1/8/2024  Authorization Expiration Date: 4/7/2025  Medication: Emgality 120 mg every 28 days-PA APPROVED   Approved Dose/Quantity: UP TO ONE PEN PER 28 DAYS   Reference #:     Insurance Company: Blue Plus PMAP - Phone [phone] Fax [phone]  Expected CoPay:       CoPay Card Available:      Foundation Assistance Needed:    Which Pharmacy is filling the prescription (Not needed for infusion/clinic administered): [facility] -- [place] - [place], [place] - [address]  Pharmacy Notified:  Yes- **Instructed pharmacy to notify patient when script is ready to /ship.**- Pharmacy stated that they have a paid claim on medication on 3/21/2024 and noted Approval on medication.   Patient Notified:  No

## 2024-03-28 ENCOUNTER — TELEPHONE (OUTPATIENT)
Dept: NEUROLOGY | Facility: CLINIC | Age: 34
End: 2024-03-28
Payer: COMMERCIAL

## 2024-03-28 NOTE — TELEPHONE ENCOUNTER
Prior Authorization Retail Medication Request    Medication/Dose: ubrogepant (UBRELVY) 100 MG tablet  Diagnosis and ICD code (if different than what is on RX):    New/renewal/insurance change PA/secondary ins. PA:  Previously Tried and Failed:    Flexeril  Naratriptan  Rizatriptan  Nurtec    Rationale:  migraine    Insurance   Primary: Blue Plus  Insurance ID:  [id number]    Pharmacy Information (if different than what is on RX)  Name:    Phone:    Fax:

## 2024-04-10 NOTE — TELEPHONE ENCOUNTER
Retail Pharmacy Prior Authorization Team   Phone: [phone]    PA Initiation    Medication: UBRELVY 100 MG PO TABS  Insurance Company: BPT - Phone [phone] Fax [phone]  Pharmacy Filling the Rx: [facility] -- [place] - [place], [place] - [address]  Filling Pharmacy Phone: [phone]  Filling Pharmacy Fax:    Start Date: 4/10/2024

## 2024-04-11 NOTE — TELEPHONE ENCOUNTER
Prior Authorization Approval    Medication: UBRELVY 100 MG PO TABS  Authorization Effective Date: 6/2/2023  Authorization Expiration Date: 8/31/2024  Approved Dose/Quantity:   Reference #:     Insurance Company: [employer] - Phone [phone] Fax [phone]  Expected CoPay: $    CoPay Card Available:      Financial Assistance Needed:   Which Pharmacy is filling the prescription: [facility] -- [place], [place] - [address]  Pharmacy Notified: Yes  Patient Notified: **Instructed pharmacy to notify patient when script is ready to /ship.**

## 2024-04-16 ENCOUNTER — TELEPHONE (OUTPATIENT)
Dept: INTERNAL MEDICINE | Facility: CLINIC | Age: 34
End: 2024-04-16

## 2024-04-16 ENCOUNTER — VIRTUAL VISIT (OUTPATIENT)
Dept: INTERNAL MEDICINE | Facility: CLINIC | Age: 34
End: 2024-04-16
Payer: COMMERCIAL

## 2024-04-16 DIAGNOSIS — G43.709 CHRONIC MIGRAINE WITHOUT AURA WITHOUT STATUS MIGRAINOSUS, NOT INTRACTABLE: ICD-10-CM

## 2024-04-16 DIAGNOSIS — Z74.09 IMPAIRED MOBILITY AND ACTIVITIES OF DAILY LIVING: Primary | ICD-10-CM

## 2024-04-16 DIAGNOSIS — Z78.9 IMPAIRED MOBILITY AND ACTIVITIES OF DAILY LIVING: Primary | ICD-10-CM

## 2024-04-16 DIAGNOSIS — G43.809 VESTIBULAR MIGRAINE: ICD-10-CM

## 2024-04-16 DIAGNOSIS — Q79.62 HYPERMOBILE EHLERS-DANLOS SYNDROME: ICD-10-CM

## 2024-04-16 PROCEDURE — 99215 OFFICE O/P EST HI 40 MIN: CPT | Mod: 95 | Performed by: NURSE PRACTITIONER

## 2024-04-16 NOTE — PATIENT INSTRUCTIONS
Home Health Aid or PCA (Personal Care Assistant)  People must contact their county public health office for a screening or additional information.    Visit [email or web address]. and select Counties/regional offices or call the Disability Linkage Line at [phone].    Providers are also listed online at [email or web address].     More information is available online at [email or web address].     A consumer guidebook for Personal Care Assistance Services is  located at [email or web address] or by  calling [phone] or [phone].    Thank you for visiting the Primary Care Center today at the [facility]! The following is some information about our clinic:     Primary Care Center Frequently-Asked Questions    (1) How do I schedule appointments at the [facility]?     Primary Care--to schedule or make changes to an existing appointment, please call our primary care line at [phone].    Labs--to schedule a lab appointment at the [facility] you can use [facility] or call [phone]. If you have a [facility] location that is closer to home, you can reach out to that location for scheduling options.     Imaging--if you need to schedule a CT, X-ray, MRI, ultrasound, or other imaging study you can call [phone] to schedule at the [facility] or any other [facility] imaging location.     Referrals--if a referral to another specialty was ordered you can expect a phone call from their scheduling team. If you have not heard from them in a week, please call us or send us a [facility] message to check the status or get a scheduling number. Please note that this only applies to internal [facility] referrals. If the referral is external you would need to contact their office for scheduling.     (2) I have a question about my visit, who do I contact?     You can call us at the primary care line at [phone] to ask  questions about your visit. You can also send a secure message through [employer], which is reviewed by clinic staff. Please note that [employer] messages have a twenty-four to forty-eight business hour turnaround time and should not be used for urgent concerns.    (3) How will I get the results of my tests?    If you are signed up for [employer] all tests will be released to you within twenty-four hours of resulting. Please allow three to five days for your doctor to review your results and place a note interpreting the results. If you do not have [employer] you will receive your results through mail seven to ten business days following the return of the tests. Please note that if there should be any urgent or concerning results that your doctor or their registered nurse will reach out to you the same day as the tests come back. If you have follow up questions about your results or would like to discuss the results in detail please schedule a follow up with your provider either in person or virtually.     (4) How do I get refills of my prescriptions?     You should always first contact your pharmacy for refills of your medications. If submitting a refill request on [employer], please be sure to submit the request only once--repeat requests can cause delays in refill. If you are requesting a NEW medication or a medication related to new symptoms you will need to schedule an appointment with a provider prior to approval. Please note: Routine medication refills have up to one to three business day turnaround whereas controlled substances refills have up to five to seven business day turnaround.    (5) I have new symptoms, what do I do?     If you are having an immediate medical emergency, you should dial 911 for assistance.   For anything urgent that needs to be seen within a few hours to one day you should visit a local urgent care for assistance.  For non-urgent symptoms that need to be seen within a few days to a week you can  schedule with an available provider in primary care by going to [employer] or calling [phone].   If you are not sure how serious your symptoms are or you would like to receive medical advice you can always call [phone] to speak with a triage nurse.

## 2024-04-16 NOTE — TELEPHONE ENCOUNTER
Patient confirmed scheduled appointment:  Date: 5/31  Time: 9:30a  Visit type: rtn  Provider: pcp

## 2024-04-16 NOTE — PROGRESS NOTES
Екатерина is a 33 year old who is being evaluated via a billable video visit.    How would you like to obtain your AVS? MyChart  If the video visit is dropped, the invitation should be resent by: Text to cell phone: [phone]  Will anyone else be joining your video visit? No      Assessment & Plan     Impaired mobility and activities of daily living  Hypermobile Harvey-Danlos syndrome  Order for wheelchair provided.  See DME documentation below.  She has difficulty caring for herself and completing activities of daily living such as meal prep, laundry, and basic housework, and she would benefit from a home health aide to assist with these tasks.  She had started applying for CADI waiver, and would benefit from involvement from social work to ensure she has follow-up and receives the appropriate resources for support in the community.  - Wheelchair Order  - Social Work Referral Specific Sites Only - See Locations in Order; Future      Chronic migraine without aura without status migrainosus, not intractable  Vestibular migraine  Continue regular follow-up with neurology.     - Social Work Referral Specific Sites Only - See Locations in Order; Future      45 minutes spent by me on the date of the encounter doing chart review, history and exam, documentation and further activities per the note        Return in about 6 weeks (around 5/28/2024).  To discuss sleep concerns    Subjective   Екатерина is a 33 year old, presenting for the following health issues:  RECHECK and Harvey Danlos      Video Start Time: 11:33 AM    History of Present Illness       Reason for visit:  Harvey Danlos    She eats 4 or more servings of fruits and vegetables daily.She consumes 0 sweetened beverage(s) daily.She exercises with enough effort to increase her heart rate 30 to 60 minutes per day.  She exercises with enough effort to increase her heart rate 7 days per week.   She is taking medications regularly.     Following with Neurology   Met  "with Dr. Carpenter for EDS    Doing \"okay\"   Emotionally doing fairly well. Physically feels pretty disabled and that combined without having help.  Chronic vestibular migraine.    1 month ago, back went out, wasn't able to do much for many days, then over using arms and dislocated L shoulder, was very painful.  Normally swims 6 days a week and pilates 2 days a week and had to stop both.  Back went out again on Friday. Up and driving, wearing back brace again today, a lot of pain but is more functional.   Partner was gone last week and couldn't do laundry, couldn't cook or move around kitchen easily.  Trying to find help.  Applied for CADI waiver last year, has been a long process.    Reading--eye gliding exacerbates vertigo for several days, hard to complete forms and still preserve function.    Family has been trying to help, mom found [employer], so she could have some financial assistance and maintain Medicaid.    Would like to hire help for 1-2 hr/d.  Needs eligibility letter saying disabling medical condition before age 26 (even if not diagnosed)--genetic condition, symptoms started around puberty, first time her back went out was age 14.    Chronic vestibular migraines started in 2016.    Would like to work, but needs help with assistance technology, working with [employer] (did intake), may have assistance if she doesn't need to use her vision.      Migraines--Started Emgality ~2 months ago, hard to track if significant improvement yet. Does have nausea with this, but willing to keep trying it.  Good acute therapy.  Attacks are less \"fully\" disabling (not having to lie down in dark room, vomiting, etc), and able to sit/talk through a lot of pain and dizziness.    Feels trapped.  Was living in [place], had to move back to [place] d/t health issues.     Needs assistance at home, help on days when physical symptoms are too severe.  Partner is helpful and supportive, but working, and is a lot for him to " "manage.    Wheelchair--had been given to her by [employer] but this is now in disrepair, wondering about an order, uses for long distances if hips and feet are problematic. Hx fracture in R foot, never fully healed, had to wear a walking boot last summer and couldn't bear weight. By end of day, legs are sore.  Has had intermittent leg pain throughout the life, feels like \"bones\" are sore and \"expanding\", more frequent in the evenings. Notices correlation with menstrual cycle, will have for a few days at a time and then will resolve for 7 months, but activity used to help, but now noticing more often, and if uses wheelchair will be more tolerable.  Has a leg pump device (doesn't help), walking (made it worse), and increasing electrolytes (no improvement).  Feels swollen, heavy and achy, but no visible edema.  Elevating and rest seem to help more than others.   Sees rheumatology.          Review of Systems  Constitutional, HEENT, cardiovascular, pulmonary, gi and gu systems are negative, except as otherwise noted.      Objective    Vitals - Patient Reported  Systolic (Patient Reported):  (Not today)  Weight (Patient Reported): 77.6 kg (171 lb)  Height (Patient Reported): 162.6 cm (5' 4\")  BMI (Based on Pt Reported Ht/Wt): 29.35  Pain Score:  (Declined)      Vitals:  No vitals were obtained today due to virtual visit.    Physical Exam   GENERAL: alert and no distress  EYES: Eyes grossly normal to inspection.  No discharge or erythema, or obvious scleral/conjunctival abnormalities.  RESP: No audible wheeze, cough, or visible cyanosis.    SKIN: Visible skin clear. No significant rash, abnormal pigmentation or lesions.  NEURO: Cranial nerves grossly intact.  Mentation and speech appropriate for age.  PSYCH: Appropriate affect, tone, and pace of words          Video-Visit Details    Type of service:  Video Visit   Video End Time:11:59 AM  Originating Location (pt. Location): Home    Distant Location (provider location):  " Off-site  Platform used for Video Visit: Volodymyr  Signed Electronically by: MAJO Reyes CNP  DME (Durable Medical Equipment) Orders and Documentation  Orders Placed This Encounter   Procedures    Wheelchair Order        The patient was assessed and it was determined the patient is in need of the following listed DME Supplies/Equipment. Please complete supporting documentation below to demonstrate medical necessity.      Patient has a history of multiple chronic joint pain and hypermobile Harvey-Danlos syndrome, which impairs her mobility and activities of daily living, requiring the use of a manual wheelchair to allow for improved mobility in and out of the home.

## 2024-04-16 NOTE — LETTER
2024       RE: Екатерина Lucero  [address] [address]       To whom it may concern:      Ms. Екатерина Lucero ( 1990) has disabling medical conditions, including (but not limited to) hypermobile Harvey-Danlos syndrome and chronic migraines, and began experiencing symptoms of these condition in adolescence (prior to age 21 years old).      Sincerely,      MAJO Reyes CNP

## 2024-04-16 NOTE — Clinical Note
Hi,  Would you be able to send the DME order for the wheelchair to the medical supply store of her choice?  Thanks! -Geri

## 2024-04-16 NOTE — LETTER
2024       RE: Екатерина Lucero  [address] [address]       To whom it may concern:      Ms. Екатерина Lucero ( 1990) has a disabling medical condition, and began experiencing symptoms of this condition in adolescence (prior to age 26 years old).        Sincerely,    MAJO Reyes CNP

## 2024-04-25 ENCOUNTER — TELEPHONE (OUTPATIENT)
Dept: INTERNAL MEDICINE | Facility: CLINIC | Age: 34
End: 2024-04-25
Payer: COMMERCIAL

## 2024-04-25 NOTE — TELEPHONE ENCOUNTER
[facility] Call Center    Phone Message    May a detailed message be left on voicemail: yes     Reason for Call: Other: Her back pain isn't getting any better, what should she do?  Should she go to Urgent care or ER?  She does have an appt with you coming up, please call.  Also would like an update on her wheelchair and PCA, please call.       Action Taken: Message routed to:  [facility] P[facility]    Travel Screening: Not Applicable

## 2024-04-26 NOTE — TELEPHONE ENCOUNTER
Called and lvm- should go to [facility] if pain worsening[facility]    Duglas Holland RN on 4/26/2024 at 12:06 PM

## 2024-04-29 ENCOUNTER — PATIENT OUTREACH (OUTPATIENT)
Dept: CARE COORDINATION | Facility: CLINIC | Age: 34
End: 2024-04-29
Payer: COMMERCIAL

## 2024-04-29 NOTE — PROGRESS NOTES
Social Work - Telephone/MyChart message  [facility]  Data:   Patient Name: Екатерина Lucero  Goes By: Екатерина FLOYD/Age: 1990 (33 year old)      Referral Source: MAJO Duarte CNP    Reason for Referral: PCA    Intervention: Left voice message for patient on 2024 and Sent patient MyChart message on 2024 .   Plan:  will await patient's return phone call/message and provide assistance at that time.      [facility]  Clinical , Outpatient Specialty Clinics  [facility] and Surgery [facility]  Direct Phone: [phone]

## 2024-05-01 ENCOUNTER — OFFICE VISIT (OUTPATIENT)
Dept: INTERNAL MEDICINE | Facility: CLINIC | Age: 34
End: 2024-05-01
Payer: COMMERCIAL

## 2024-05-01 VITALS
SYSTOLIC BLOOD PRESSURE: 122 MMHG | WEIGHT: 179.1 LBS | HEART RATE: 79 BPM | BODY MASS INDEX: 30.74 KG/M2 | OXYGEN SATURATION: 97 % | DIASTOLIC BLOOD PRESSURE: 74 MMHG

## 2024-05-01 DIAGNOSIS — M79.652 PAIN IN BOTH THIGHS: ICD-10-CM

## 2024-05-01 DIAGNOSIS — M79.18 MYOFASCIAL PAIN: Primary | ICD-10-CM

## 2024-05-01 DIAGNOSIS — M79.651 PAIN IN BOTH THIGHS: ICD-10-CM

## 2024-05-01 DIAGNOSIS — Q79.62 HYPERMOBILE EHLERS-DANLOS SYNDROME: ICD-10-CM

## 2024-05-01 PROCEDURE — 99215 OFFICE O/P EST HI 40 MIN: CPT | Performed by: NURSE PRACTITIONER

## 2024-05-01 NOTE — PROGRESS NOTES
"  Assessment & Plan     Myofascial pain  Pain in both thighs  Hypermobile Harvey-Danlos syndrome  She reports persistent bilateral thigh pain x1 month--she was evaluated at [facility] and is scheduled for a lumbar spine MRI next week; Rheumatology questioned if association with Emgality (given timing/onset), though Neurology does not feel that leg pain would be related.  Will check CRP, CK, Vit D, and can try thigh-high compression stockings, as elevating the legs has helped reduce pain.  I am not familiar with EDS causing these type of leg pain symptoms; she can continue to use self-massage prn, and work with PT as she has been doing.    - Compression Sleeve/Stocking Order  - CRP, inflammation; Future  - CK total; Future  - Vitamin D Deficiency; Future        45 minutes spent by me on the date of the encounter doing chart review, history and exam, documentation and further activities per the note    Follow-up in 4 weeks as scheduled    Subjective   Екатерина is a 33 year old, presenting for the following health issues:  Follow Up (Follow up on back and neck pain.)      5/1/2024     4:36 PM   Additional Questions   Roomed by KTR     History of Present Illness       Back Pain:  She presents for follow up of back pain. Patient's back pain is a chronic problem.  Location of back pain:  Right lower back and left lower back  Description of back pain: sharp  Back pain spreads: right thigh and left thigh    Since patient first noticed back pain, pain is: gradually worsening  Does back pain interfere with her job:  Yes       She eats 4 or more servings of fruits and vegetables daily.She consumes 0 sweetened beverage(s) daily.She exercises with enough effort to increase her heart rate 30 to 60 minutes per day.  She exercises with enough effort to increase her heart rate 7 days per week.   She is taking medications regularly.       Back pain persists.  Feels \"different\" than prior.  R SI pain, but now also pain in both thighs with " "\"bone achy\" and \"full\" feeling, like feels swollen (though no visible edema).  Has had similar symptoms in the past, but typically was transient, would be day right before her period or right after a vaccine.  Has been going on for 1 month, intense, has tried swimming, baths, leg pumps treatment.  Not sure if connected to back, her back had gone out and then it started.  Helps to massage the thighs or elevate the legs.    Went to science museum and used wheelchair the whole time, thought walking 10 min would maybe help, but pain was worse afterwards.    Went to [facility] 4/29/24, has MRI scheduled for next week.    Rheumatology appt yesterday, wondered if associated with Emgality (L shoulder, back and legs) and done Qlipta in the past and felt sick with this.  Rheum ordered labs--CK, CRP, Vit D. Neurology didn't feel it would be related to Emgality, suspected EDS.  Has done 2 doses and next due in a few days, but feels like she should maybe skip it.     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   Most recently, feels more inflammatory, was carrying her 4-y.o. niece which she's done a lot before, didn't feel any pain, but 3 days later developed pain, slow progression, felt more and more achy as the day progressed.  Took cyclobenzaprine for a few days, usually helps, but less so this time.  Has been doing PT exercises.   Back Pain is on R side, but leg pain is equally localized to bilateral thighs.    Started meeting with Dr. Alarcon (who also has EDS) [facility], Naturopath/nutrition support helping with PCOS/Thyroid issue  Interviewing disability .    Wondering about PT for EDS, current one at [facility].    Endocrinology for PCOS/weight loss clinic. Weight normally 145-150, now up to 180 over past 6-8 months.  Naltrexone prescribed 4.5 mg at night, plan to increase dose.        Review of Systems  Constitutional, HEENT, cardiovascular, pulmonary, gi and gu systems " are negative, except as otherwise noted.      Objective    /74 (BP Location: Right arm, Patient Position: Sitting, Cuff Size: Adult Regular)   Pulse 79   Wt 81.2 kg (179 lb 1.6 oz)   SpO2 97%   BMI 30.74 kg/m    Body mass index is 30.74 kg/m .  Physical Exam   GENERAL: alert and no distress  EYES: Eyes grossly normal to inspection, and conjunctivae and sclerae normal  HENT: nose and mouth without ulcers or lesions  NECK: no adenopathy, no asymmetry, masses, or scars  RESP: lungs clear to auscultation - no rales, rhonchi or wheezes  CV: regular rate and rhythm, normal S1 S2, no S3 or S4, no murmur, click or rub, no peripheral edema  ABDOMEN: soft, nontender, no hepatosplenomegaly, no masses and bowel sounds normal  MS: no gross musculoskeletal defects noted, no edema, pain with palpation over R SI joint and lumbar spine, leg strength 5/5 in BLE  SKIN: no suspicious lesions or rashes  NEURO: Normal strength and tone, mentation intact and speech normal, sensation intact, patellar DTRs 2+ in BLE  PSYCH: mentation appears normal, affect normal/bright            Signed Electronically by: MAJO Reyes CNP      DME (Durable Medical Equipment) Orders and Documentation  Orders Placed This Encounter   Procedures    Compression Sleeve/Stocking Order        The patient was assessed and it was determined the patient is in need of the following listed DME Supplies/Equipment. Please complete supporting documentation below to demonstrate medical necessity.      DME (Durable Medical Equipment) Orders and Documentation  Orders Placed This Encounter   Procedures    Compression Sleeve/Stocking Order      The patient was assessed and it was determined the patient is in need of the following listed DME Supplies/Equipment. Please complete supporting documentation below to demonstrate medical necessity.      Compression Sleeve/Stocking(s) Supplies Documentation  The patient needs compression stockings for Other reason:  bilateral thigh pain

## 2024-05-06 ENCOUNTER — PATIENT OUTREACH (OUTPATIENT)
Dept: CARE COORDINATION | Facility: CLINIC | Age: 34
End: 2024-05-06
Payer: COMMERCIAL

## 2024-05-06 ENCOUNTER — TELEPHONE (OUTPATIENT)
Dept: INTERNAL MEDICINE | Facility: CLINIC | Age: 34
End: 2024-05-06
Payer: COMMERCIAL

## 2024-05-06 NOTE — TELEPHONE ENCOUNTER
[facility] Call Center    Phone Message    May a detailed message be left on voicemail: yes     Reason for Call: Patient wants to know if you can send to another company the orders for the wheelchair that does take her insurance and please let her know what company it is so that she can call if needed and phone number.  Thanks  please send a message through her my chart.     Action Taken: Message routed to:  [facility] Psychiatric    Travel Screening: Not Applicable

## 2024-05-06 NOTE — PROGRESS NOTES
Social Work - Follow-Up  [facility]    Data/Intervention:    Patient Name: Екатерина Lucero Goes By: Екатерина FLOYD/Age: 1990 (33 year old)    Reason for Follow-Up:  PCA services    Collaborated With:    -Patient    Intervention/Education/Resources Provided:  Had a [facility] assessment last year. Got as far as the in-person assessment. Had to get financial information sorted, has a trust so had to consult with a . Now has the information, but Patient believes that the [facility] assessment has . Wondering about how to go about getting this going again.  provieded the contact information for [facility] to pursue/resume the assessment.     Assessment/Plan:  Patient to contact [facility].  will remain available as needed.     Previously provided patient/family with writer's contact information and availability.      TILA Lin  Clinical , Outpatient Specialty Clinics  [facility] and Surgery [facility]  Direct Phone: [phone]

## 2024-05-08 DIAGNOSIS — Z74.09 IMPAIRED MOBILITY AND ACTIVITIES OF DAILY LIVING: ICD-10-CM

## 2024-05-08 DIAGNOSIS — Q79.62 HYPERMOBILE EHLERS-DANLOS SYNDROME: ICD-10-CM

## 2024-05-08 DIAGNOSIS — M79.18 MYOFASCIAL PAIN: Primary | ICD-10-CM

## 2024-05-08 DIAGNOSIS — M79.651 PAIN IN BOTH THIGHS: ICD-10-CM

## 2024-05-08 DIAGNOSIS — M79.652 PAIN IN BOTH THIGHS: ICD-10-CM

## 2024-05-08 DIAGNOSIS — Z78.9 IMPAIRED MOBILITY AND ACTIVITIES OF DAILY LIVING: ICD-10-CM

## 2024-05-08 NOTE — PROGRESS NOTES
DME (Durable Medical Equipment) Orders and Documentation  Orders Placed This Encounter   Procedures    Walker Order        The patient was assessed and it was determined the patient is in need of the following listed DME Supplies/Equipment. Please complete supporting documentation below to demonstrate medical necessity.      Patient would benefit from Rollator walker due to impaired mobility related to multiple joint pain, thigh pain, and hypermobility EDS.  MAJO Reyes CNP

## 2024-05-08 NOTE — TELEPHONE ENCOUNTER
Faxed DME order for Walker  to [facility] @ [phone]    Princess Mukherjee RN on 5/8/2024 at 2:59 PM

## 2024-05-12 ENCOUNTER — MYC MEDICAL ADVICE (OUTPATIENT)
Dept: INTERNAL MEDICINE | Facility: CLINIC | Age: 34
End: 2024-05-12
Payer: COMMERCIAL

## 2024-05-18 NOTE — TELEPHONE ENCOUNTER
Recommend follow-up appointemtn to discuss leg pain and next steps for evaluation and treatment options.  MAJO Reyes CNP

## 2024-05-21 ENCOUNTER — VIRTUAL VISIT (OUTPATIENT)
Dept: INTERNAL MEDICINE | Facility: CLINIC | Age: 34
End: 2024-05-21
Payer: COMMERCIAL

## 2024-05-21 DIAGNOSIS — Q79.62 HYPERMOBILE EHLERS-DANLOS SYNDROME: ICD-10-CM

## 2024-05-21 DIAGNOSIS — Z74.09 IMPAIRED MOBILITY AND ACTIVITIES OF DAILY LIVING: ICD-10-CM

## 2024-05-21 DIAGNOSIS — Z78.9 IMPAIRED MOBILITY AND ACTIVITIES OF DAILY LIVING: ICD-10-CM

## 2024-05-21 DIAGNOSIS — M79.18 MYOFASCIAL PAIN: ICD-10-CM

## 2024-05-21 DIAGNOSIS — G43.709 CHRONIC MIGRAINE WITHOUT AURA WITHOUT STATUS MIGRAINOSUS, NOT INTRACTABLE: ICD-10-CM

## 2024-05-21 DIAGNOSIS — K21.9 GASTROESOPHAGEAL REFLUX DISEASE, UNSPECIFIED WHETHER ESOPHAGITIS PRESENT: Primary | ICD-10-CM

## 2024-05-21 PROCEDURE — 99215 OFFICE O/P EST HI 40 MIN: CPT | Mod: 95 | Performed by: NURSE PRACTITIONER

## 2024-05-21 PROCEDURE — 99417 PROLNG OP E/M EACH 15 MIN: CPT | Performed by: NURSE PRACTITIONER

## 2024-05-21 NOTE — PATIENT INSTRUCTIONS
Thank you for visiting the Primary Care Center today at the [facility]! The following is some information about our clinic:     Primary Care Center Frequently-Asked Questions    (1) How do I schedule appointments at the [facility]?     Primary Care--to schedule or make changes to an existing appointment, please call our primary care line at [phone].    Labs--to schedule a lab appointment at the [facility] you can use [employer] or call [phone]. If you have a [place] location that is closer to home, you can reach out to that location for scheduling options.     Imaging--if you need to schedule a CT, X-ray, MRI, ultrasound, or other imaging study you can call [phone] to schedule at the [facility] or any other [facility] imaging location.     Referrals--if a referral to another specialty was ordered you can expect a phone call from their scheduling team. If you have not heard from them in a week, please call us or send us a [employer] message to check the status or get a scheduling number. Please note that this only applies to internal [facility] referrals. If the referral is external you would need to contact their office for scheduling.     (2) I have a question about my visit, who do I contact?     You can call us at the primary care line at [phone] to ask questions about your visit. You can also send a secure message through [employer], which is reviewed by clinic staff. Please note that [employer] messages have a twenty-four to forty-eight business hour turnaround time and should not be used for urgent concerns.    (3) How will I get the results of my tests?    If you are signed up for [employer] all tests will be released to you within twenty-four hours of resulting. Please allow three to five days for your doctor to review your results and place a note interpreting the results. If you do not have [employer] you will receive your  results through mail seven to ten business days following the return of the tests. Please note that if there should be any urgent or concerning results that your doctor or their registered nurse will reach out to you the same day as the tests come back. If you have follow up questions about your results or would like to discuss the results in detail please schedule a follow up with your provider either in person or virtually.     (4) How do I get refills of my prescriptions?     You should always first contact your pharmacy for refills of your medications. If submitting a refill request on [employer], please be sure to submit the request only once--repeat requests can cause delays in refill. If you are requesting a NEW medication or a medication related to new symptoms you will need to schedule an appointment with a provider prior to approval. Please note: Routine medication refills have up to one to three business day turnaround whereas controlled substances refills have up to five to seven business day turnaround.    (5) I have new symptoms, what do I do?     If you are having an immediate medical emergency, you should dial 911 for assistance.   For anything urgent that needs to be seen within a few hours to one day you should visit a local urgent care for assistance.  For non-urgent symptoms that need to be seen within a few days to a week you can schedule with an available provider in primary care by going to [employer] or calling [phone].   If you are not sure how serious your symptoms are or you would like to receive medical advice you can always call [phone] to speak with a triage nurse.

## 2024-05-21 NOTE — PROGRESS NOTES
Letter and Wheelchair DME order faxed to [email or web address] at fax number [phone].  Flora DOE LPN  [facility]

## 2024-05-21 NOTE — Clinical Note
Hi could you fax the wheelchair (power lightweight foldable wheelchair) order from today's encounter along with the letter from today's encounter to [employer] (Fax: [phone]? Thank you! -Geri

## 2024-05-21 NOTE — PROGRESS NOTES
Екатерина is a 33 year old who is being evaluated via a billable video visit.    How would you like to obtain your AVS? MyChart  If the video visit is dropped, the invitation should be resent by: Text to cell phone: [phone]  Will anyone else be joining your video visit? Yes, jaime He      Are refills needed on medications prescribed by this physician? NO     Assessment & Plan     Gastroesophageal reflux disease, unspecified whether esophagitis present  Discussed PPI--will start 20 mg Omeprazole, reviewed take on empty stomach 30 minutes prior to eating in the AM.  Okay to use carafate to help facilitate healing.  Avoid target foods. Plan to treat with PPI x8 weeks, then reassess for taper.  If insufficient response, may increase to Omeprazole 20 mg BID.   - omeprazole (PRILOSEC) 20 MG DR capsule; Take 1 capsule (20 mg) by mouth daily    Myofascial pain  Hypermobile Harvey-Danlos syndrome  Impaired mobility and activities of daily living  Chronic migraine without aura without status migrainosus, not intractable  Patient needs K5 lightweight manual wheel chair with a smart drive and self propelling power assist.  New order for power foldable lightweight wheelchair; she is applying for a di to help cover the cost of this.  Will fax order and letter to [employer] (Fax: [phone]).  See DME documentation below.  Regarding the Emgality--no known association with leg pain or GERD in drug references, but she notes correlation with timing.  Encouraged to consider using next dose to help keep migraines controlled, monitor for any worsening leg pain/acid reflux.    - Wheelchair Order        59 minutes spent by me on the date of the encounter doing chart review, history and exam, documentation and further activities per the note    Follow-up as scheduled in ~2 weeks    Subjective   Екатерина is a 33 year old, presenting for the following health issues:  RECHECK, Musculoskeletal Problem, and Gastrophageal  Reflux      Video Start Time: 11:30 AM    History of Present Illness       Reason for visit:  Acid Reflux  Symptom onset:  3-4 weeks ago  Symptoms include:  Throat pain, stomach pain, burning in bottom of throat  Symptom intensity:  Severe  Symptom progression:  Worsening  Had these symptoms before:  Yes  Has tried/received treatment for these symptoms:  No  What makes it worse:  Possibly Qulipta, acidic foods  What makes it better:  Eating bland foods, Tums for a short period of time    She eats 4 or more servings of fruits and vegetables daily.She consumes 0 sweetened beverage(s) daily.She exercises with enough effort to increase her heart rate 30 to 60 minutes per day.  She exercises with enough effort to increase her heart rate 4 days per week.   She is taking medications regularly.     On video call with her Mom Ying present.     Leg pain--maybe getting a little better.  Has been about 2 weeks since Emgality injection was due.  Not sure if related to this or not.  Can flare, worsens with walking.  Has been trying to stay active/pilates/swimming.  Compression stockings help distract.    Bulging herniated disc, not sure if related.    Naltrexone 4.5 mg with weight loss clinic, hasn't increased dose d/t dosing regimen, plan to double it as a next step.      Insurance wouldn't cover a power wheelchair.  Went to "[employer] supply [employer], di she could apply for to help cover some of the cost. Allows her to do things by herself.  Requesting letter of medical necessity for Power Folding lightweight wheelchair, something light that she could fold and lift into the car.  Can't go for walks.  Shoulder issues impede maneuvering wheelchair  [employer] Fax: [phone] (letter and order).    Acid reflux--concerned to take anything.  Worsening a month ago, waken in the middle of the night with burning, progressed during the day.  Takes Tums, but generally tries to avoid this if she can.  Started more mild, but has  "progressed to \"24/7\" burning.  Got bad over the weekend, had to sleep upright.  Hard to take migraine meds, coffee also helps manage migraines; d/t severity of acid reflux, did a virtual visit for urgent care, recommended Carafate, but she hasn't started yet, wanted to be sure this was a good option.   Concerned because she can't take ibuprofen d/t GERD, but anticipating her period starting soon.  Feels trapped. Switched to bland diet, broth, rice, chicken. Helped a little bit.    Last summer had similar symptoms with CGRP antagonist (Qlipta). Used Tums as needed last summer, Qlipta stopped in Sept. Had helped initially with migraines.   Emgality x3 months.    Dull pain epigastric, usually limited to mid-esophageal burning.  Next Emgality injection, wondering about benefit/risk of side effects.      Review of Systems  Constitutional, HEENT, cardiovascular, pulmonary, gi and gu systems are negative, except as otherwise noted.      Objective    Vitals - Patient Reported  Systolic (Patient Reported):  (Not today)  Weight (Patient Reported): 80.7 kg (178 lb)  Height (Patient Reported): 162.6 cm (5' 4\")  BMI (Based on Pt Reported Ht/Wt): 30.55  Pain Score: Extreme Pain (8)  Pain Loc: Head      Vitals:  No vitals were obtained today due to virtual visit.    Physical Exam   GENERAL: alert and no distress  EYES: Eyes grossly normal to inspection.  No discharge or erythema, or obvious scleral/conjunctival abnormalities.  RESP: No audible wheeze, cough, or visible cyanosis.    SKIN: Visible skin clear. No significant rash, abnormal pigmentation or lesions.  NEURO: Cranial nerves grossly intact.  Mentation and speech appropriate for age.  PSYCH: Appropriate affect, tone, and pace of words          Video-Visit Details    Type of service:  Video Visit   Video End Time:12:02 PM  Originating Location (pt. Location): Home    Distant Location (provider location):  Off-site  Platform used for Video Visit: Volodymyr  Signed " Electronically by: MAJO Reyes CNP    DME (Durable Medical Equipment) Orders and Documentation  K5 lightweight manual wheel chair with a smart drive and self propelling power assist      The patient was assessed and it was determined the patient is in need of the following listed DME Supplies/Equipment. Please complete supporting documentation below to demonstrate medical necessity.      Patient is unable to walk long distances; she is unable to self-propel a manual wheelchair.  She needs to be able to fold and lift a lightweight chair into a vehicle.  A K5 lightweight manual wheel chair with a smart drive and self propelling power assist is required to help improve patient mobility and engagement in community.

## 2024-05-21 NOTE — LETTER
5/21/2024       RE: Екатерина Lucero  [address]  [facility] [address]       To whom it may concern:    Ms. Екатерина Lucero requires a power light weight foldable wheelchair due to her history of hypermobile Harvey-Danlos syndrome, myofacial pain, impaired mobility and activities of daily living, and chronic migraine.  She has reduced mobility related to musculo-skeletal joint pain and needs to be able to have lightweight wheelchair that can be folded to allow for transporting in/out of vehicles.  She is unable to self-propel a manual wheelchair due to chronic shoulder pain.    Sincerely,    MAJO Reyes CNP

## 2024-05-21 NOTE — LETTER
5/21/2024       RE: Екатерина Lucero  [address]  [facility] [address]       To whom it may concern:    Ms. Екатерина Lucero requires K5 lightweight manual wheel chair with a smart drive and self propelling power assist due to her history of hypermobile Harvey-Danlos syndrome, myofacial pain, impaired mobility and activities of daily living, and chronic migraine.  She has reduced mobility related to musculo-skeletal joint pain and needs to be able to have lightweight wheelchair that can be folded to allow for transporting in/out of vehicles.  She is unable to self-propel a manual wheelchair due to chronic shoulder pain.      Thank you,      MAJO Reyes CNP

## 2024-05-24 ENCOUNTER — VIRTUAL VISIT (OUTPATIENT)
Dept: NEUROLOGY | Facility: CLINIC | Age: 34
End: 2024-05-24
Payer: COMMERCIAL

## 2024-05-24 VITALS — HEIGHT: 64 IN | WEIGHT: 179 LBS | BODY MASS INDEX: 30.56 KG/M2

## 2024-05-24 DIAGNOSIS — M79.10 MYALGIA: Primary | ICD-10-CM

## 2024-05-24 DIAGNOSIS — R42 DIZZINESS: ICD-10-CM

## 2024-05-24 DIAGNOSIS — G43.709 CHRONIC MIGRAINE WITHOUT AURA WITHOUT STATUS MIGRAINOSUS, NOT INTRACTABLE: ICD-10-CM

## 2024-05-24 PROCEDURE — 99214 OFFICE O/P EST MOD 30 MIN: CPT | Mod: 95 | Performed by: PSYCHIATRY & NEUROLOGY

## 2024-05-24 ASSESSMENT — PAIN SCALES - GENERAL: PAINLEVEL: SEVERE PAIN (6)

## 2024-05-24 ASSESSMENT — MIGRAINE DISABILITY ASSESSMENT (MIDAS)
ON A SCALE FROM 0-10 ON AVERAGE HOW PAINFUL WERE HEADACHES: 6
HOW MANY DAYS IN THE PAST 3 MONTHS HAVE YOU HAD A HEADACHE: 90
HOW MANY DAYS WAS HOUSEWORK PRODUCTIVITY CUT IN HALF DUE TO HEADACHES: 90
HOW MANY DAYS DID YOU MISS WORK OR SCHOOL BECAUSE OF HEADACHES: 90
HOW MANY DAYS DID YOU NOT DO HOUSEWORK BECAUSE OF HEADACHES: 90
TOTAL SCORE: 390
HOW OFTEN WERE SOCIAL ACTIVITIES MISSED DUE TO HEADACHES: 30
HOW MANY DAYS WAS YOUR PRODUCTIVITY CUT IN HALF BECAUSE OF HEADACHES: 90

## 2024-05-24 ASSESSMENT — HEADACHE IMPACT TEST (HIT 6)
HOW OFTEN DO HEADACHES LIMIT YOUR DAILY ACTIVITIES: ALWAYS
HOW OFTEN HAVE YOU FELT FED UP OR IRRITATED BECAUSE OF YOUR HEADACHES: ALWAYS
HOW OFTEN DID HEADACHS LIMIT CONCENTRATION ON WORK OR DAILY ACTIVITY: ALWAYS
WHEN YOU HAVE A HEADACHE HOW OFTEN DO YOU WISH YOU COULD LIE DOWN: ALWAYS
WHEN YOU HAVE HEADACHES HOW OFTEN IS THE PAIN SEVERE: ALWAYS
HIT6 TOTAL SCORE: 78
HOW OFTEN HAVE YOU FELT TOO TIRED TO WORK BECAUSE OF YOUR HEADACHES: ALWAYS

## 2024-05-24 ASSESSMENT — PATIENT HEALTH QUESTIONNAIRE - PHQ9: SUM OF ALL RESPONSES TO PHQ QUESTIONS 1-9: 12

## 2024-05-24 NOTE — NURSING NOTE
Is the patient currently in the state of [place]? YES    Visit mode:VIDEO    If the visit is dropped, the patient can be reconnected by: VIDEO VISIT: Text to cell phone:   Telephone Information:   Mobile [phone]       Will anyone else be joining the visit? NO  (If patient encounters technical issues they should call [phone])    How would you like to obtain your AVS? MyChart    Are changes needed to the allergy or medication list? No    Are refills needed on medications prescribed by this physician? YES    Reason for visit: Follow Up    Barbara Cortes [facility]    Depression Response    Patient completed the PHQ-9 assessment for depression and scored >9? Yes  Question 9 on the PHQ-9 was positive for suicidality? No  Does patient have current mental health provider? Yes    Is this a virtual visit? Yes   Does patient have suicidal ideation (positive question 9)? No - offer to place Mental Health Referral.  Patient declined referral/not needed    I personally notified the following: visit provider

## 2024-05-24 NOTE — PROGRESS NOTES
Virtual Visit Details    Type of service:  Video Visit   Video Start Time: 1:52 PM  Video End Time: 2:21PM    Originating Location (pt. Location): Home  Distant Location (provider location):  Off-site  Platform used for Video Visit: [facility]    Neurology Progress Note    [facility]    Екатерина Lucero MRN# [id number]   Age: 33 year old YOB: 1990     PCP: Geri Loyola            Assessment and Plan:     (M79.10) Myalgia  (primary encounter diagnosis)  Comment: I will send for EMG to make sure we aren't missing some sort of muscle disease causing her leg pain.   Plan: EMG            (G43.062) Chronic migraine without aura without status migrainosus, not intractable  Comment: I do not feel Emgality is causing problems with pain but I can not prove it. The patient can hold the Emgality for one more month and observe what happens to headaches and what happens ot pain. Asked her to send update. In the mean time she can use Ubrelvy, cyclobenzaprine, metoclopramide and benadryl to help headaches.     Dizziness  Addendum: After appt the patient called in asking what we can do for her dizziness, honestly I am not sure what can be done will refer to Dr Diza for assessment. Due to her EDS the patient has concerns of CSF leak. I do not think she endorses symptoms that fit but EDS does confound the picture.    On the day of the appt I spent 35 minutes caring for the patient that included visit time and chart review.     Shazia Amaya MD           History of Present Illness:   CC: Salvatore    Екатерина is a 33 year old woman who established care in October 2023. She has a diagnosis of chronic migraine, TMD, EDS, and PPPD.    She was prescribed Emgality in October but started it about 3 months ago-prior to that she was observing how she felt off Qulipta ( a lot better.)  She isn't sure if it is working yet (understands it can take longer to show effect) she was supposed to take last injection 1.5 week sago but she  "wanted to speak first due to some questions about possible side effects.  She reports at last visit she was very active, swimming and biking. She feels that 4 weeks after injection of Emgality her \"back went out,\" She has low back pain and bilateral leg pain. Walking makes leg pain worse. She is avoiding walking. She is using a power wheelchair.  She has had this before but in the past it has only lasted a few hours at a time. She describes achy bone pain. No swelling. In the thighs, front side of legs. The pain is different than nerve pain.   It got worse and now seems to be getting better with the holding of the Emgality dose. She wonders if they are related. She has seen rheumatology who advised her to ask neurology. She has had normal CPK and inflammatory markers. She had an MRI lumbar spine that showed a disc bulge at L5-S1 but no clear nerve root impingement.   If she doesn't sit down she has unbearable pain but doesn't endorse weakness. She has had sciatica in the past and this is different. Bladder function is ok.      One month ago she started getting GERD symptoms. At night she would wake up with burning in throat. Intense burning all the time 1 week ago. She did a virtual urgent care visit. She was prescribed carafate and changed diet. Tricky because she feels her PCOS causes blood glucose problems. She has gained weight and is talking to another physician about Metformin. She estimates she has gained over 20 lbs. Her normal weight 150 lbs and now 178 lbs. Seeing weight loss clinic in [facility]. She is consuming that same amount of calories but isn't as active-shouldn't be causing that much weight gain.    She also continues to have dizziness. She has vertigo as a symptoms of migraine but also has vertigo at other times. She has a hard time reading and focusing on the text.          Physical Exam:     Deferred-pt changed this to a virtual appointment.          Pertinent History/Data for appointment:     TY " 104  CRP < 0.3    MRI lumbar spine report 5/6/24  IMPRESSION:   1. Mild posterior disc bulge small posterior central disc protrusion at the L5-S1 level causing mild ventral flattening of the thecal sac without nerve root impingement.   2. No evidence for focal nerve root impingement, subluxation, stress reaction or vertebral body compression fracture.

## 2024-05-24 NOTE — LETTER
5/24/2024       RE: Екатерина Lucero  [address]  [facility] [address]       Dear Colleague,    Thank you for referring your patient, Екатерина Lucero, to the [facility] [place] [facility]. Please see a copy of my visit note below.    Neurology Progress Note    [facility]    Екатерина Lucero MRN# [id number]   Age: 33 year old YOB: 1990     PCP: Geri Loyola            Assessment and Plan:     (M79.10) Myalgia  (primary encounter diagnosis)  Comment: I will send for EMG to make sure we aren't missing some sort of muscle disease causing her leg pain.   Plan: EMG            (I49.574) Chronic migraine without aura without status migrainosus, not intractable  Comment: I do not feel Emgality is causing problems with pain but I can not prove it. The patient can hold the Emgality for one more month and observe what happens to headaches and what happens ot pain. Asked her to send update. In the mean time she can use Ubrelvy, cyclobenzaprine, metoclopramide and benadryl to help headaches.     Dizziness  Addendum: After appt the patient called in asking what we can do for her dizziness, honestly I am not sure what can be done will refer to Dr Diaz for assessment. Due to her EDS the patient has concerns of CSF leak. I do not think she endorses symptoms that fit but EDS does confound the picture.    On the day of the appt I spent 35 minutes caring for the patient that included visit time and chart review.     Shazia Amaya MD           History of Present Illness:   CC: Salvatore    Екатерина is a 33 year old woman who established care in October 2023. She has a diagnosis of chronic migraine, TMD, EDS, and PPPD.    She was prescribed Emgality in October but started it about 3 months ago-prior to that she was observing how she felt off Qulipta ( a lot better.)  She isn't sure if it is working yet (understands it can take longer to show  "effect) she was supposed to take last injection 1.5 week sago but she wanted to speak first due to some questions about possible side effects.  She reports at last visit she was very active, swimming and biking. She feels that 4 weeks after injection of Emgality her \"back went out,\" She has low back pain and bilateral leg pain. Walking makes leg pain worse. She is avoiding walking. She is using a power wheelchair.  She has had this before but in the past it has only lasted a few hours at a time. She describes achy bone pain. No swelling. In the thighs, front side of legs. The pain is different than nerve pain.   It got worse and now seems to be getting better with the holding of the Emgality dose. She wonders if they are related. She has seen rheumatology who advised her to ask neurology. She has had normal CPK and inflammatory markers. She had an MRI lumbar spine that showed a disc bulge at L5-S1 but no clear nerve root impingement.   If she doesn't sit down she has unbearable pain but doesn't endorse weakness. She has had sciatica in the past and this is different. Bladder function is ok.      One month ago she started getting GERD symptoms. At night she would wake up with burning in throat. Intense burning all the time 1 week ago. She did a virtual urgent care visit. She was prescribed carafate and changed diet. Tricky because she feels her PCOS causes blood glucose problems. She has gained weight and is talking to another physician about Metformin. She estimates she has gained over 20 lbs. Her normal weight 150 lbs and now 178 lbs. Seeing weight loss clinic in [facility]. She is consuming that same amount of calories but isn't as active-shouldn't be causing that much weight gain.    She also continues to have dizziness. She has vertigo as a symptoms of migraine but also has vertigo at other times. She has a hard time reading and focusing on the text.          Physical Exam:     Deferred-pt changed this to a virtual " appointment.          Pertinent History/Data for appointment:       CRP < 0.3    MRI lumbar spine report 5/6/24  IMPRESSION:   1. Mild posterior disc bulge small posterior central disc protrusion at the L5-S1 level causing mild ventral flattening of the thecal sac without nerve root impingement.   2. No evidence for focal nerve root impingement, subluxation, stress reaction or vertebral body compression fracture.         Again, thank you for allowing me to participate in the care of your patient.      Sincerely,    Shazia Amaya MD

## 2024-05-31 ENCOUNTER — MYC MEDICAL ADVICE (OUTPATIENT)
Dept: INTERNAL MEDICINE | Facility: CLINIC | Age: 34
End: 2024-05-31

## 2024-05-31 DIAGNOSIS — M51.369 BULGING OF LUMBAR INTERVERTEBRAL DISC: Primary | ICD-10-CM

## 2024-05-31 DIAGNOSIS — Q79.62 HYPERMOBILE EHLERS-DANLOS SYNDROME: ICD-10-CM

## 2024-06-05 ENCOUNTER — MYC MEDICAL ADVICE (OUTPATIENT)
Dept: INTERNAL MEDICINE | Facility: CLINIC | Age: 34
End: 2024-06-05
Payer: COMMERCIAL

## 2024-06-12 NOTE — TELEPHONE ENCOUNTER
See MyChart message; encourage working with EDS-informed PT, core strength, and pool exercise, if no improvement, see spine team.   MAJO Reyes CNP

## 2024-06-18 ENCOUNTER — THERAPY VISIT (OUTPATIENT)
Dept: OCCUPATIONAL THERAPY | Facility: CLINIC | Age: 34
End: 2024-06-18
Attending: PSYCHIATRY & NEUROLOGY
Payer: COMMERCIAL

## 2024-06-18 DIAGNOSIS — G43.709 CHRONIC MIGRAINE WITHOUT AURA WITHOUT STATUS MIGRAINOSUS, NOT INTRACTABLE: Primary | ICD-10-CM

## 2024-06-18 DIAGNOSIS — H53.9 VISUAL DISTURBANCE: ICD-10-CM

## 2024-06-18 PROCEDURE — 97166 OT EVAL MOD COMPLEX 45 MIN: CPT | Mod: GO | Performed by: OCCUPATIONAL THERAPIST

## 2024-06-18 PROCEDURE — 97535 SELF CARE MNGMENT TRAINING: CPT | Mod: GO | Performed by: OCCUPATIONAL THERAPIST

## 2024-06-18 NOTE — PROGRESS NOTES
"OCCUPATIONAL THERAPY EVALUATION  Type of Visit: Evaluation        Fall Risk Screen:  Fall screen completed by: OT  Have you fallen 2 or more times in the past year?: No  Have you fallen and had an injury in the past year?: No  Is patient a fall risk?: No    Subjective      Presenting condition or subjective complaint: learn assistive technology with reading and writing due to vision challenges  Date of onset: 10/10/23    Relevant medical history:     Intractable chronic migraine without aura and without status migrainosus [G43.719]  - Primary; Visual disturbance [H53.9]; PMH: ADD, chronic daily H/A, depression with anxiety, gluten intolerance, h/o eating disorder - in remission, Sjogren's syndrome with extraglandular involvement, Lyme disease, TMJ, EDS, specific learning disorder with reading impairment, POTS per patient, see chart for other;  Patient unable to read even limited due to intolerance of eye movements; Patient reports \"my eyes are my proprioception\" and it causes her to feel like her body is moving (pursuits and saccades are very challenging especially longer pursuits and saccades). Has started and stopped vision therapy ([facility], Dr. Joyce; [facility] for vision care) but increased vertigo symptoms with vision therapy - didn't have good meds at the time for migraines (now on prn medications and takes about every other day, monthly injections for migraine prevention but off now - weird symptoms, taking muscle relaxers at night especially during parts of menstrual cycle - trigger for her). Feels dizziness and migraine all the time - severity changes.  Has dyslexia - reading has always been difficult; threshold changed after getting migraines.  Any turning makes her feel nauseas.    Dates & types of surgery:  see chart    Prior diagnostic imaging/testing results:       Prior therapy history for the same diagnosis, illness or injury:   has started and stopped vision therapy a number of times " but it has exacerbated symptoms (mostly vertigo, but gets vestibular migraines)    Prior Level of Function  Transfers:  see below  Ambulation:  see below  ADL: Independent  IADL: Driving, Finances, Meal preparation, Medication management, Work    Living Environment  Social support: With a significant other or spouse (boyfriend)   Type of home: House; 2-story   Stairs to enter the home: Yes 3 Is there a railing: Yes     Ramp:     Stairs inside the home: Yes 10 Is there a railing: Yes     Help at home: Home management tasks (cooking, cleaning); Home and Yard maintenance tasks; Medication and/or finances; Assist for driving and community activities; patient reports hard balance - can do things at home but with very high cost per patient report (increased dizziness, pain, etc.).  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 aside from vision due to challenges with vision    Pain assessment: Pain present  Location: headache/Ratin/10  Location: back/Ratin/10     Objective   LOW VISION EVALUATION  ADDITIONAL HISTORY:  Current Responsibilities - IADLS: Driving, Finances, Meal preparation, Medication management (little meal prep - simple - no chopping/stirring - shoulder)    Others present at visit: none    COGNITIVE/BEHAVIORAL:  Communication: Intact  Cognitive Status: Oriented to person, place and time  Behavior:  "Appropriate    Physical Status/Equipment   Physical Status: decreased balance, numerous physical limitations due to EDS, POTS, etc.  Mobility Equipment Used: Cane (single end), Wheelchair (manual), Wheelchair (power)  Bathing ADL Equipment Used:  didn't address  Toileting ADL Equipment Used:  didn't address    VISUAL REPORT:  Functional complaints: Avocational tasks, Homemaking, Leisure, Reading, Safety in mobility, Work related tasks  Visual Complaints: Visual fatigue, Difficulty maintaining focus, Double vision, Light sensitivity, reports double vision all the time - closes eyes for ~10 minutes at a time throughout the day  Everett Baldwin Symptoms? No   Magnifiers and Low Vision AE owned:  none  Reading Glasses: Single lens (distance), and for reading, etc.; used to have prism in glasses for ~1 year but doesn't now - feels she might benefit from prisms again - sees Dr. Joyce in [place] for her eyes (about 1x/year)  Power per MD report:  N/A  Technology: Smart phone, Laptop, apple    LIGHTING AND GLARE:  Is your lighting adequate? No at home, has a lot of exposed bulbs  Is glare a problem? Yes indoors, Yes outdoors  Are you satisfied with your sunglasses? Yes ; also wears a hat a lot  Sunglass Details: Prescription sunglasses    VISUAL ACUITY:  Distance Acuity Right Eye:  unknown/to be assessed  Distance Acuity Left Eye:  unknown/to be assessed  Distance Acuity Both Eyes Together:  unknown/to be assessed  Near Visual Acuity:  see below    CONTRAST SENSITIVITY:  Contrast Sensitivity (score/25): NT    MN Read  Smallest Print Size Read: ambient light with Rx at 16\": .6M - using finger to guide at times and closing R eye frequently; with task light on chart at 16\": .4M  Critical Print Size: .5M  Words per minute at critical print size: 150wpm    Visual Field:  NT due to time limitations    Visual Attention:  NT    Oculomotor  NT due to increased symptoms with MNRead - will assess in future session    Assessment & Plan "   CLINICAL IMPRESSIONS  Medical Diagnosis: Intractable chronic migraine without aura and without status migrainosus (G43.719)  - Primary; Visual disturbance (H53.9)    Treatment Diagnosis: decreased ADL/IADL independence    Impression/Assessment: Pt is a 33 year old female presenting to Occupational Therapy due to visual limitations/deficits (only addressing vision this POC).  The following significant findings have been identified: Impaired mobility, Pain, and Impaired visual perception.  These identified deficits interfere with their ability to perform work tasks, recreational activities, household chores, driving , household mobility, community mobility, medication management, financial management,  yard work, and meal planning and preparation as compared to previous level of function.     Clinical Decision Making (Complexity):  Assessment of Occupational Performance: 5 or more Performance Deficits  Occupational Performance Limitations: functional mobility, driving and community mobility, health management and maintenance, home establishment and management, meal preparation and cleanup, shopping, school, work, and leisure activities  Clinical Decision Making (Complexity): Moderate complexity    PLAN OF CARE  Treatment Interventions:  Interventions: Self-Care/Home Management, Therapeutic Activity    Long Term Goals   OT Goal 1  Goal Identifier: Near Vision  Goal Description: Patient will demonstrate 3 pieces of adaptive equipment and/or adaptive techniques (including assistive technology) in regards to magnification, lighting, glare for increased ADL/IADL independence with near vision tasks such reading, meal prep, medication management, computer use.  Rationale: In order to maximize safety and independence with performance of self-care activities;In order to safely and appropriately apply compensatory strategies with ADL/IADL performance  Target Date: 12/13/24  OT Goal 2  Goal Identifier: Environmental  modifications  Goal Description: Patient will demonstrate and/or verbalize 2 environmentally-based ADL modifications to improve visual-based ADL/IADL activities.  Rationale: In order to maximize safety and independence with performance of self-care activities;In order to safely and appropriately apply compensatory strategies with ADL/IADL performance  Target Date: 12/13/24      Frequency of Treatment: 1x/week, decreasing frequency as indicated  Duration of Treatment: 6 months (extended due to potential scheduling conflicts)     Recommended Referrals to Other Professionals:  N/A  Education Assessment: Learner/Method: Patient;Listening;Reading;Demonstration  Education Comments: Cell phone adaptations: Screen reader via voice content (educated in all features); screen reader for laptop and how to hover over print to be read under pointer; verbal education and voiceover and how this differs from spoken content; VoiceCommands through [employer] and numerous information and commands that can be done/obtained     Risks and benefits of evaluation/treatment have been explained.   Patient/Family/caregiver agrees with Plan of Care.     Evaluation Time:    OT Eval, Moderate Complexity Minutes (15448): 28  Signing Clinician: Bing Mcgill OT        [facility]                                                                                   OUTPATIENT OCCUPATIONAL THERAPY      PLAN OF TREATMENT FOR OUTPATIENT REHABILITATION   Patient's Last Name, First Name, Екатерина Reina YOB: 1990   Provider's Name   [facility]   Medical Record No.  [id number]     Onset Date: 10/10/23 Start of Care Date: 06/18/24     Medical Diagnosis:  Intractable chronic migraine without aura and without status migrainosus (G43.719)  - Primary; Visual disturbance (H53.9)      OT Treatment Diagnosis:  decreased ADL/IADL independence Plan of Treatment  Frequency/Duration:1x/week,  decreasing frequency as indicated/6 months (extended due to potential scheduling conflicts)    Certification date from 06/18/24   To 09/15/24        See note for plan of treatment details and functional goals     Bing Mcgill OT                         I CERTIFY THE NEED FOR THESE SERVICES FURNISHED UNDER        THIS PLAN OF TREATMENT AND WHILE UNDER MY CARE     (Physician attestation of this document indicates review and certification of the therapy plan).              Referring Provider:  Shazia Amaya    Initial Assessment  See Epic Evaluation- 06/18/24

## 2024-07-01 ENCOUNTER — THERAPY VISIT (OUTPATIENT)
Dept: OCCUPATIONAL THERAPY | Facility: CLINIC | Age: 34
End: 2024-07-01
Attending: PSYCHIATRY & NEUROLOGY
Payer: COMMERCIAL

## 2024-07-01 ENCOUNTER — TELEPHONE (OUTPATIENT)
Dept: NEUROLOGY | Facility: CLINIC | Age: 34
End: 2024-07-01
Payer: COMMERCIAL

## 2024-07-01 DIAGNOSIS — G43.711 INTRACTABLE CHRONIC MIGRAINE WITHOUT AURA AND WITH STATUS MIGRAINOSUS: Primary | ICD-10-CM

## 2024-07-01 DIAGNOSIS — H53.9 VISUAL DISTURBANCE: Primary | ICD-10-CM

## 2024-07-01 PROCEDURE — 97530 THERAPEUTIC ACTIVITIES: CPT | Mod: GO | Performed by: OCCUPATIONAL THERAPIST

## 2024-07-01 PROCEDURE — 97535 SELF CARE MNGMENT TRAINING: CPT | Mod: GO | Performed by: OCCUPATIONAL THERAPIST

## 2024-07-01 RX ORDER — KETOROLAC TROMETHAMINE 30 MG/ML
30 INJECTION, SOLUTION INTRAMUSCULAR; INTRAVENOUS ONCE
Status: COMPLETED | OUTPATIENT
Start: 2024-07-01 | End: 2024-07-05

## 2024-07-01 RX ORDER — HYDROXYZINE HYDROCHLORIDE 50 MG/ML
50 INJECTION, SOLUTION INTRAMUSCULAR ONCE
Status: DISCONTINUED | OUTPATIENT
Start: 2024-07-01 | End: 2024-07-05

## 2024-07-01 NOTE — TELEPHONE ENCOUNTER
Health Call Center    Phone Message    May a detailed message be left on voicemail: yes     Reason for Call: Symptoms or Concerns     If patient has red-flag symptoms, warm transfer to triage line    Current symptom or concern: Patient is having a really bad migraine episode - patient is taking  All medication prescribed.  Patient said she really needs help with what to do.     Symptoms have been present for:   14 days (s) and getting worse    Has patient previously been seen for this? Yes    By     Date: ASAP    Are there any new or worsening symptoms? Yes: getting worse every day    Action Taken: [facility]    Travel Screening: Not Applicable     Date of Service:

## 2024-07-01 NOTE — TELEPHONE ENCOUNTER
I spoke with Екатерина and discussed her migraine she has had for 14 days and feels that it has gotten worse the last 4-5 days.  She feels a lot of pain behind her eyes and feels that her vision is blurry and her dizziness has increased along with these headaches.  She denies weakness or n/v.  She has been using Ubrelvy 100-150 mg every day with minimal relief, flexeril and gamma core.  Nothing is really helping.  She is currently treating a flare of gastritis with diet and opeprazole and feels hesitant to try more medications  she stopped Emgality two months ago because of leg/injection pain and this has completely resolved.  She tells me that she has Nurtec at home that was prescribed by an outside provider that she never started and is wondering if she should try that.  Екатерина is hesitant to go to the ER for treatment because she is worried the IV medications that they use will be hard on her stomach.  I will update Dr Amaya and call her back with any further suggestions.    Message sent to Dr Amaya

## 2024-07-01 NOTE — TELEPHONE ENCOUNTER
I returned a call to Екатерина to discuss her symptoms of migraine.  Had to leave a voice message asking for a call back.

## 2024-07-01 NOTE — TELEPHONE ENCOUNTER
Health Call Center     Phone Message     May a detailed message be left on voicemail: yes      Reason for Call: Symptoms or Concerns      If patient has red-flag symptoms, warm transfer to triage line     Current symptom or concern: Migraine Concerns     Symptoms have been present for:   14 days (s)    Has patient previously been seen for this? Yes     Are there any new or worsening symptoms? Yes: Pt calling back in regards to voicemail left; writer unable to connect with anyone at this time.    Please return pt call at [phone]     Action Taken[facility]     Travel Screening: Not Applicable          Date of Service:

## 2024-07-05 ENCOUNTER — OFFICE VISIT (OUTPATIENT)
Dept: NEUROLOGY | Facility: CLINIC | Age: 34
End: 2024-07-05
Payer: COMMERCIAL

## 2024-07-05 VITALS
HEART RATE: 101 BPM | SYSTOLIC BLOOD PRESSURE: 121 MMHG | BODY MASS INDEX: 29.89 KG/M2 | RESPIRATION RATE: 16 BRPM | DIASTOLIC BLOOD PRESSURE: 86 MMHG | HEIGHT: 64 IN | OXYGEN SATURATION: 97 % | WEIGHT: 175.1 LBS

## 2024-07-05 DIAGNOSIS — G43.701 CHRONIC MIGRAINE WITHOUT AURA WITH STATUS MIGRAINOSUS, NOT INTRACTABLE: Primary | ICD-10-CM

## 2024-07-05 PROCEDURE — 99215 OFFICE O/P EST HI 40 MIN: CPT | Mod: 25 | Performed by: PSYCHIATRY & NEUROLOGY

## 2024-07-05 PROCEDURE — 96372 THER/PROPH/DIAG INJ SC/IM: CPT | Performed by: PSYCHIATRY & NEUROLOGY

## 2024-07-05 RX ORDER — VENLAFAXINE 25 MG/1
TABLET ORAL
Qty: 90 TABLET | Refills: 11 | Status: SHIPPED | OUTPATIENT
Start: 2024-07-05 | End: 2024-08-15 | Stop reason: DRUGHIGH

## 2024-07-05 RX ORDER — SUCRALFATE 1 G/1
TABLET ORAL
COMMUNITY
Start: 2024-05-19 | End: 2025-05-19

## 2024-07-05 RX ADMIN — KETOROLAC TROMETHAMINE 30 MG: 30 INJECTION, SOLUTION INTRAMUSCULAR; INTRAVENOUS at 17:35

## 2024-07-05 ASSESSMENT — PAIN SCALES - GENERAL: PAINLEVEL: SEVERE PAIN (7)

## 2024-07-05 NOTE — NURSING NOTE
"Chief Complaint   Patient presents with    RECHECK     /86 (BP Location: Right arm, Patient Position: Sitting, Cuff Size: Adult Regular)   Pulse 101   Resp 16   Ht 1.635 m (5' 4.37\")   Wt 79.4 kg (175 lb 1.6 oz)   SpO2 97%   BMI 29.71 kg/m      ANAMARIA VEE    "

## 2024-07-05 NOTE — PROGRESS NOTES
Neurology Progress Note    [facility]    Екатерина Lucero MRN# [id number]   Age: 34 year old YOB: 1990     PCP: Geri Loyola            Assessment and Plan:     Status migrainosus    Toradol provided today in the clinic.   Right deltoid injected with a 30 gauge 1 inch needle. She declined Vistaril so it was cancelled.   She tolerated injection well, no bleeding observed. I spent 10 minutes with her after the injection before discharge and she denied an untoward side effects.   30 mg 1 ml injected 0 wasted    Will start venlafaxine for migraine prevention.   Awaiting Dr Diaz consultation for TOS    40 minutes spent caring for the patient on the day of the visit. Includes chart review, visit time, injection time and direct observation after injection.     ADDENDUM: Pt contacted office 7/8 reporting continued symptoms and emphasizing symptoms are different than she has had before. MRI jon added and advised her to go to ED if she is needing swifter treatment.       Shazia Amaya MD           History of Present Illness:   CC:    Екатерина Lucero is a 34 year old woman who is added on today urgently due to intractable migraine symptoms.     Last time she was seen we held Emgality due to leg and back pain complaints. She reports 7 weeks after last Emgality injection leg and back pain. Started 3 weeks after injection and got progressively worse with 3 injections. 90 % resolved. She still wants to complete EMG.     1st symptom in quads as knee bends (she gets this symptom once a year in past but this was worse) similar with Qulipta.     Currently she is an intractable migraine cycle that has lasted 14 days. She is using Ubrelvy daily.   Severe pain behind eyes, visual exercises feels hard eyes feel strained, anything visual like looking for something feels like body   Photophobia is worse than normal  No phonophobia  No nausea  Ubrelvy makes it more tolerable but not aborting it.   Cyclobenzarpine at  "night  Naltrexone at night  Benadryl at night    Not tried metoclopramide-worried about tardive dyskinesia risk    GERD resolved   Insomnia better-now Emgality    Bing OT started- 3 sessions.              Physical Exam:     /86 (BP Location: Right arm, Patient Position: Sitting, Cuff Size: Adult Regular)   Pulse 101   Resp 16   Ht 1.635 m (5' 4.37\")   Wt 79.4 kg (175 lb 1.6 oz)   SpO2 97%   BMI 29.71 kg/m    The patient is photophobic-lights are deemed  She has no aphasia or dysarthria  Her gait is stable.         "

## 2024-07-05 NOTE — LETTER
7/5/2024     RE: Екатерина Lucero  [address]  [place] [address]     Dear Colleague,    Thank you for referring your patient, Екатерина Lucero, to the [facility] [place] at [facility]. Please see a copy of my visit note below.    Neurology Progress Note    [facility]    Екатерина Lucero MRN# [id number]   Age: 34 year old YOB: 1990     PCP: Geri Loyola            Assessment and Plan:     Status migrainosus    Toradol provided today in the clinic.   Right deltoid injected with a 30 gauge 1 inch needle. She declined Vistaril so it was cancelled.   She tolerated injection well, no bleeding observed. I spent 10 minutes with her after the injection before discharge and she denied an untoward side effects.   30 mg 1 ml injected 0 wasted    Will start venlafaxine for migraine prevention.   Awaiting Dr Diaz consultation for TOS    40 minutes spent caring for the patient on the day of the visit. Includes chart review, visit time, injection time and direct observation after injection.     ADDENDUM: Pt contacted office 7/8 reporting continued symptoms and emphasizing symptoms are different than she has had before. MRI jon added and advised her to go to ED if she is needing swifter treatment.       Shazia Amaya MD           History of Present Illness:   CC:    Екатерина Lucero is a 34 year old woman who is added on today urgently due to intractable migraine symptoms.     Last time she was seen we held Emgality due to leg and back pain complaints. She reports 7 weeks after last Emgality injection leg and back pain. Started 3 weeks after injection and got progressively worse with 3 injections. 90 % resolved. She still wants to complete EMG.     1st symptom in quads as knee bends (she gets this symptom once a year in past but this was worse) similar with Qulipta.     Currently she is an intractable migraine cycle that has lasted 14  "days. She is using Ubrelvy daily.   Severe pain behind eyes, visual exercises feels hard eyes feel strained, anything visual like looking for something feels like body   Photophobia is worse than normal  No phonophobia  No nausea  Ubrelvy makes it more tolerable but not aborting it.   Cyclobenzarpine at night  Naltrexone at night  Benadryl at night    Not tried metoclopramide-worried about tardive dyskinesia risk    GERD resolved   Insomnia better-now Emgality    Bing OT started- 3 sessions.              Physical Exam:     /86 (BP Location: Right arm, Patient Position: Sitting, Cuff Size: Adult Regular)   Pulse 101   Resp 16   Ht 1.635 m (5' 4.37\")   Wt 79.4 kg (175 lb 1.6 oz)   SpO2 97%   BMI 29.71 kg/m    The patient is photophobic-lights are deemed  She has no aphasia or dysarthria  Her gait is stable.       Again, thank you for allowing me to participate in the care of your patient.    Sincerely,  Shazia Amaya MD    "

## 2024-07-08 ENCOUNTER — THERAPY VISIT (OUTPATIENT)
Dept: OCCUPATIONAL THERAPY | Facility: CLINIC | Age: 34
End: 2024-07-08
Attending: PSYCHIATRY & NEUROLOGY
Payer: COMMERCIAL

## 2024-07-08 DIAGNOSIS — H53.9 VISUAL DISTURBANCE: Primary | ICD-10-CM

## 2024-07-08 DIAGNOSIS — G43.711 INTRACTABLE CHRONIC MIGRAINE WITHOUT AURA AND WITH STATUS MIGRAINOSUS: Primary | ICD-10-CM

## 2024-07-08 PROCEDURE — 97535 SELF CARE MNGMENT TRAINING: CPT | Mod: GO | Performed by: OCCUPATIONAL THERAPIST

## 2024-08-02 ENCOUNTER — OFFICE VISIT (OUTPATIENT)
Dept: NEUROLOGY | Facility: CLINIC | Age: 34
End: 2024-08-02
Payer: COMMERCIAL

## 2024-08-02 VITALS
HEART RATE: 87 BPM | RESPIRATION RATE: 16 BRPM | SYSTOLIC BLOOD PRESSURE: 125 MMHG | OXYGEN SATURATION: 97 % | DIASTOLIC BLOOD PRESSURE: 77 MMHG

## 2024-08-02 DIAGNOSIS — G43.809 VESTIBULAR MIGRAINE: Primary | ICD-10-CM

## 2024-08-02 PROCEDURE — 99215 OFFICE O/P EST HI 40 MIN: CPT | Performed by: PSYCHIATRY & NEUROLOGY

## 2024-08-02 RX ORDER — TIMOLOL MALEATE 5 MG/ML
1 SOLUTION/ DROPS OPHTHALMIC 2 TIMES DAILY
COMMUNITY

## 2024-08-02 ASSESSMENT — PAIN SCALES - GENERAL: PAINLEVEL: SEVERE PAIN (6)

## 2024-08-02 NOTE — LETTER
8/2/2024       RE: Екатерина Lucero  [address]  [place] [address]     Dear Colleague,    Thank you for referring your patient, Екатерина Lucero, to the [facility] [place] at [facility]. Please see a copy of my visit note below.    Neurology Progress Note    [facility]    Екатерина Lucero MRN# [id number]   Age: 34 year old YOB: 1990     PCP: Geri Loyola            Assessment and Plan:     Intractable vestibular migraine:  No better than one month ago. Plan below discussed and orders placed.     Try Nurtec from home supply.   If fails we will try Zavzpret  Start venlafaxine for at least a 12 week trial before adding other new preventives.   She will  schedule MRI brain  Visual September start date  Keep appt with Dr Diaz in November, you are on the wait list if cancellations occur.   Forms filled out for CAD waiver  Neurivio forms will be completed, stop gammacore    40 minutes spent caring for the patient on the day of the visit. Including visit time and documentation.     Shazia Amaya           History of Present Illness:   CC: Recheck    Екатерина is a 34 year old woman who was seen as recently as 7/5/24 for a migraine flare. She reports she is still in a flare that started June 17th.   She did not feel Toradol helped. Other interventions like Ubrelvy have not helped.   Fluctuating only four days reasonable control.   Nausea is worse. Dizziness is a prominent symptom.  Gammacore not helping, would like to try Neurivio  She feels her vision symptoms are worse and new. Vision blurring 30-40 minutes. Pain behind eyes. She has seen an optometrist who has told her she thinks the eyes are causing her symptoms and that she needs Prisms and vision therapy. She is having trouble finding a place to receive treatment that insurance would cover. [place] being the only location she can find and she feels the travel is not  possible as it makes her feel worse.   She has been taking timolol BID. For one week per optometrist    At last appointment on 7/5 she was prescribed venlafaxine as a preventive at a smaller dose than the extended release she had been given in the past she has not started it yet.   She will start venlafaxine Monday. She asks about Botox I advised she try one preventive at a time.  She has not scheduled the MRI brain that was ordered 7/8 when she had sent my charts emphasizing this migraine flare was different than anything she had experienced before with more dizziness nausea and vision symptoms.     Acute:  Nurtec-never tried  Ubrelvy worked as first not working now  Multiple triptans tried. Rizatriptan, sumatriptan naratriptan-didn't work , face pain    She never did the MRI brain         Physical Exam:     /77   Pulse 87   Resp 16   SpO2 97%            Pertinent History/Data for appointment:         Again, thank you for allowing me to participate in the care of your patient.      Sincerely,    Shazia Amaya MD

## 2024-08-02 NOTE — PROGRESS NOTES
Neurology Progress Note    [facility]    Екатерина Girardp MRN# [id number]   Age: 34 year old YOB: 1990     PCP: Geri Loyola            Assessment and Plan:     Intractable vestibular migraine:  No better than one month ago. Plan below discussed and orders placed.     Try Nurtec from home supply.   If fails we will try Zavzpret  Start venlafaxine for at least a 12 week trial before adding other new preventives.   She will  schedule MRI brain  Visual September start date  Keep appt with Dr Diaz in November, you are on the wait list if cancellations occur.   Forms filled out for CADI waiver  Neurivio forms will be completed, stop gammacore    40 minutes spent caring for the patient on the day of the visit. Including visit time and documentation.     Shazia Amaya           History of Present Illness:   CC: Recheck    Екатерина is a 34 year old woman who was seen as recently as 7/5/24 for a migraine flare. She reports she is still in a flare that started June 17th.   She did not feel Toradol helped. Other interventions like Ubrelvy have not helped.   Fluctuating only four days reasonable control.   Nausea is worse. Dizziness is a prominent symptom.  Gammacore not helping, would like to try Neurivio  She feels her vision symptoms are worse and new. Vision blurring 30-40 minutes. Pain behind eyes. She has seen an optometrist who has told her she thinks the eyes are causing her symptoms and that she needs Prisms and vision therapy. She is having trouble finding a place to receive treatment that insurance would cover. [place] being the only location she can find and she feels the travel is not possible as it makes her feel worse.   She has been taking timolol BID. For one week per optometrist    At last appointment on 7/5 she was prescribed venlafaxine as a preventive at a smaller dose than the extended release she had been given in the past she has not started it yet.   She will start venlafaxine  Monday. She asks about Botox I advised she try one preventive at a time.  She has not scheduled the MRI brain that was ordered 7/8 when she had sent my charts emphasizing this migraine flare was different than anything she had experienced before with more dizziness nausea and vision symptoms.     Acute:  Nurtec-never tried  Ubrelvy worked as first not working now  Multiple triptans tried. Rizatriptan, sumatriptan naratriptan-didn't work , face pain    She never did the MRI brain         Physical Exam:     /77   Pulse 87   Resp 16   SpO2 97%            Pertinent History/Data for appointment:

## 2024-08-02 NOTE — PATIENT INSTRUCTIONS
Nurtec try the pills you have at home 34 times if no help send message for Zavzpret nasal spray.    We will help schedule MRI brain    Neurivio forms will be signed    Start venlafaxine Monday (ok to use Zofran)    Keep appointment with Dr Diaz in November-you are on wait list.    Ask GI doctor about omeprazole.

## 2024-08-05 ENCOUNTER — MYC MEDICAL ADVICE (OUTPATIENT)
Dept: INTERNAL MEDICINE | Facility: CLINIC | Age: 34
End: 2024-08-05
Payer: COMMERCIAL

## 2024-08-06 ENCOUNTER — CARE COORDINATION (OUTPATIENT)
Dept: NEUROLOGY | Facility: CLINIC | Age: 34
End: 2024-08-06
Payer: COMMERCIAL

## 2024-08-08 DIAGNOSIS — G43.701 CHRONIC MIGRAINE WITHOUT AURA WITH STATUS MIGRAINOSUS, NOT INTRACTABLE: Primary | ICD-10-CM

## 2024-08-15 ENCOUNTER — OFFICE VISIT (OUTPATIENT)
Dept: NEUROLOGY | Facility: CLINIC | Age: 34
End: 2024-08-15
Attending: PSYCHIATRY & NEUROLOGY
Payer: COMMERCIAL

## 2024-08-15 DIAGNOSIS — G43.701 CHRONIC MIGRAINE WITHOUT AURA WITH STATUS MIGRAINOSUS, NOT INTRACTABLE: ICD-10-CM

## 2024-08-15 DIAGNOSIS — M79.10 MYALGIA: ICD-10-CM

## 2024-08-15 DIAGNOSIS — G43.701 CHRONIC MIGRAINE WITHOUT AURA WITH STATUS MIGRAINOSUS, NOT INTRACTABLE: Primary | ICD-10-CM

## 2024-08-15 PROCEDURE — 95910 NRV CNDJ TEST 7-8 STUDIES: CPT | Mod: GC | Performed by: STUDENT IN AN ORGANIZED HEALTH CARE EDUCATION/TRAINING PROGRAM

## 2024-08-15 PROCEDURE — 95885 MUSC TST DONE W/NERV TST LIM: CPT | Mod: GC | Performed by: STUDENT IN AN ORGANIZED HEALTH CARE EDUCATION/TRAINING PROGRAM

## 2024-08-15 RX ORDER — VENLAFAXINE HYDROCHLORIDE 37.5 MG/1
37.5 CAPSULE, EXTENDED RELEASE ORAL DAILY
Qty: 30 CAPSULE | Refills: 11 | Status: SHIPPED | OUTPATIENT
Start: 2024-08-15

## 2024-08-15 RX ORDER — VENLAFAXINE HYDROCHLORIDE 37.5 MG/1
37.5 CAPSULE, EXTENDED RELEASE ORAL DAILY
Qty: 30 CAPSULE | Refills: 11 | Status: SHIPPED | OUTPATIENT
Start: 2024-08-15 | End: 2024-08-15

## 2024-08-15 NOTE — LETTER
8/15/2024       RE: Екатерина Lucero  [address]  [facility] [address]     Dear Colleague,    Thank you for referring your patient, Екатерина Lucero, to the [facility] EMG [facility] [place] at [facility]. Please see a copy of my visit note below.                        [facility]  Electrodiagnostic Laboratory                 Department of Neurology                                                                                                         Test Date:  8/15/2024    Patient: Екатерина Lucero : 1990 Physician: Loli Laguna MD   Sex: Female AGE: 34 year Ref Phys: Shazia Amaya MD   ID#: [id number]   Technician: Tran Reeder     History and Examination:  34 year old female with a past medical history of migraines presents for evaluation of bilateral lower leg aching pain from the thighs to the lower legs just proximal to the ankle. She says presently these prior symptoms have since resolved. EMG was ordered to evaluate for lumbosacral radiculopathy, neuropathy, or myopathy.     Techniques:  Motor and sensory conduction studies were done with surface recording electrodes.  Upper extremities were maintained at a temperature of 32 degrees Centigrade or higher.  EMG was done with a concentric needle electrode.     Results:  Sensory and motor nerve conduction studies of the bilateral lower extremities were normal.    Needle EMG of the right leg was normal. There were no fibrillation potentials or myotonic discharged.    Interpretation:  This is a normal study. There is no electrophysiologic evidence of a diffuse myopathy, a large-fiber peripheral neuropathy, or a right lumbosacral radiculopathy in the current study.     Socrates Fatima DO   Neurophysiology Fellow    I was present for all key portions of the NCS/EMG study. All data and waveforms personally reviewed. I agree with the results and interpretation as above.     Loli  MD Luz Elena  Department of Neurology          Nerve Conduction Studies  Motor Sites      Latency Neg. Amp Neg. Amp Diff Segment Distance Velocity Neg. Dur Neg Area Diff Temperature Comment   Site (ms) Norm (mV) Norm (%)  cm m/s Norm (ms) (%) ( C)    Left Fibular (EDB) Motor   Ankle 5.4  < 6.0 4.7 -  Ankle-EDB 8   6.7  28.3    Right Fibular (EDB) Motor   Ankle 4.5  < 6.0 3.2 -  Ankle-EDB 8   7.7  29.1    Bel Fibular Head 11.3 - 3.3 - 3 Bel Fibular Head-Ankle 32 47  > 38 7.5 -6 29.3    Pop Fossa 12.3 - 3.4 - 3 Pop Fossa-Bel Fibular Head 7.5 75  > 38 7.8 6 29.3    Left Tibial (AHB) Motor   Ankle 4.9  < 6.5 14.0  > 5.0  Ankle-AH 8   4.5  28.4    Right Tibial (AHB) Motor   Ankle 4.2  < 6.5 16.1  > 5.0  Ankle-AH 8   4.3  29.3    Knee 12.2 - 14.0 - -13 Knee-Ankle 38 48  > 38 4.4 -4 29.2      F-Wave Sites      Min F-Lat Max-Min F-Lat Mean F-Lat   Site (ms) (ms) (ms)   Right Tibial F-Wave   Ankle 43.7 7.1 -     Sensory Sites      Onset Lat Peak Lat Amp (O-P) Amp (P-P) Segment Distance Velocity Temperature Comment   Site ms (ms)  V Norm ( V)  cm m/s Norm ( C)    Left Superficial Fibular Sensory   Lower Leg-Ankle 2.4 3.3 9  > 3 10 Lower Leg-Ankle 12.5 52 - 28.3    Right Superficial Fibular Sensory   Lower Leg-Ankle 2.6 3.1 9  > 3 6 Lower Leg-Ankle 12.5 48 - 28.8    Left Sural Sensory   Calf-Lat Malleolus 2.7 3.5 24  > 5 32 Calf-Lat Malleolus 14 52  > 38 28.3    Right Sural Sensory   Calf-Lat Malleolus 3.1 4.1 27  > 5 28 Calf-Lat Malleolus 14 45  > 38 27.3        Electromyography     Side Muscle Ins Act Fibs/PSW Fasc HF Amp Dur Poly Recrt Int Pat   Right Vastus med Nml None Nml 0 Nml Nml 0 Nml Nml   Right Gastroc MH Nml None Nml 0 Nml Nml 0 Nml Nml   Right Tib ant Nml None Nml 0 Nml Nml 0 Nml Nml   Right Gluteus med Nml None Nml 0 Nml Nml 0 Nml Nml     NCS Waveforms:    Motor                Sensory                F-Wave                   Again, thank you for allowing me to participate in the care of your patient.       Sincerely,    Loli Laguna MD

## 2024-08-15 NOTE — PROGRESS NOTES
[facility]  Electrodiagnostic Laboratory                 Department of Neurology                                                                                                         Test Date:  8/15/2024    Patient: Екатерина Lucero : 1990 Physician: Loli Laguna MD   Sex: Female AGE: 34 year Ref Phys: Shazia Amaya MD   ID#: [id number]   Technician: Tran Reeder     History and Examination:  34 year old female with a past medical history of migraines presents for evaluation of bilateral lower leg aching pain from the thighs to the lower legs just proximal to the ankle. She says presently these prior symptoms have since resolved. EMG was ordered to evaluate for lumbosacral radiculopathy, neuropathy, or myopathy.     Techniques:  Motor and sensory conduction studies were done with surface recording electrodes.  Upper extremities were maintained at a temperature of 32 degrees Centigrade or higher.  EMG was done with a concentric needle electrode.     Results:  Sensory and motor nerve conduction studies of the bilateral lower extremities were normal.    Needle EMG of the right leg was normal. There were no fibrillation potentials or myotonic discharged.    Interpretation:  This is a normal study. There is no electrophysiologic evidence of a diffuse myopathy, a large-fiber peripheral neuropathy, or a right lumbosacral radiculopathy in the current study.     Socrates Fatima DO   Neurophysiology Fellow    I was present for all key portions of the NCS/EMG study. All data and waveforms personally reviewed. I agree with the results and interpretation as above.     Loli Laguna MD  Department of Neurology          Nerve Conduction Studies  Motor Sites      Latency Neg. Amp Neg. Amp Diff Segment Distance Velocity Neg. Dur Neg Area Diff Temperature Comment   Site (ms) Norm (mV) Norm (%)  cm m/s Norm (ms) (%) ( C)    Left Fibular (EDB) Motor   Ankle 5.4  < 6.0 4.7 -   Ankle-EDB 8   6.7  28.3    Right Fibular (EDB) Motor   Ankle 4.5  < 6.0 3.2 -  Ankle-EDB 8   7.7  29.1    Bel Fibular Head 11.3 - 3.3 - 3 Bel Fibular Head-Ankle 32 47  > 38 7.5 -6 29.3    Pop Fossa 12.3 - 3.4 - 3 Pop Fossa-Bel Fibular Head 7.5 75  > 38 7.8 6 29.3    Left Tibial (AHB) Motor   Ankle 4.9  < 6.5 14.0  > 5.0  Ankle-AH 8   4.5  28.4    Right Tibial (AHB) Motor   Ankle 4.2  < 6.5 16.1  > 5.0  Ankle-AH 8   4.3  29.3    Knee 12.2 - 14.0 - -13 Knee-Ankle 38 48  > 38 4.4 -4 29.2      F-Wave Sites      Min F-Lat Max-Min F-Lat Mean F-Lat   Site (ms) (ms) (ms)   Right Tibial F-Wave   Ankle 43.7 7.1 -     Sensory Sites      Onset Lat Peak Lat Amp (O-P) Amp (P-P) Segment Distance Velocity Temperature Comment   Site ms (ms)  V Norm ( V)  cm m/s Norm ( C)    Left Superficial Fibular Sensory   Lower Leg-Ankle 2.4 3.3 9  > 3 10 Lower Leg-Ankle 12.5 52 - 28.3    Right Superficial Fibular Sensory   Lower Leg-Ankle 2.6 3.1 9  > 3 6 Lower Leg-Ankle 12.5 48 - 28.8    Left Sural Sensory   Calf-Lat Malleolus 2.7 3.5 24  > 5 32 Calf-Lat Malleolus 14 52  > 38 28.3    Right Sural Sensory   Calf-Lat Malleolus 3.1 4.1 27  > 5 28 Calf-Lat Malleolus 14 45  > 38 27.3        Electromyography     Side Muscle Ins Act Fibs/PSW Fasc HF Amp Dur Poly Recrt Int Pat   Right Vastus med Nml None Nml 0 Nml Nml 0 Nml Nml   Right Gastroc MH Nml None Nml 0 Nml Nml 0 Nml Nml   Right Tib ant Nml None Nml 0 Nml Nml 0 Nml Nml   Right Gluteus med Nml None Nml 0 Nml Nml 0 Nml Nml     NCS Waveforms:    Motor                Sensory                F-Wave

## 2024-08-21 ENCOUNTER — TELEPHONE (OUTPATIENT)
Dept: INTERNAL MEDICINE | Facility: CLINIC | Age: 34
End: 2024-08-21
Payer: COMMERCIAL

## 2024-08-21 NOTE — TELEPHONE ENCOUNTER
Spoke to wolfgang at [facility] regarding patients wheelchair.   Wolfgang states they did not receive the letter or chart notes from Geri done 5/21/24.  She is asking for Geri  to rewite letter to state patient needs a K5 lightweight manual wheel chair with a smart drive and self propelling power assist.  She is also asking for the chart notes to be addend to state the need for this specific wheelchair.      Princess Mukherjee RN on 8/21/2024 at 5:03 PM

## 2024-08-21 NOTE — TELEPHONE ENCOUNTER
[facility] Call Center    Phone Message    May a detailed message be left on voicemail: yes     Reason for Call: Other: wolfgang from [employer] is requesting a letter to be faxed over to her at # [phone] for a K5 lightweight manual wheel chair with a smart drive and self propelling power assist, in the letter she is requesting the diagnosis along with any chart notes that can be included. Please advise.        Action Taken: Other: PCC    Travel Screening: Not Applicable     Date of Service: 8/21/24

## 2024-08-23 NOTE — TELEPHONE ENCOUNTER
New letter signed and note addended from 5/21/24 encounter to request K5 lightweight manual wheel chair with a smart drive and self propelling power assist.    MAJO Reyes CNP

## 2024-08-27 ENCOUNTER — TRANSFERRED RECORDS (OUTPATIENT)
Dept: HEALTH INFORMATION MANAGEMENT | Facility: CLINIC | Age: 34
End: 2024-08-27
Payer: COMMERCIAL

## 2024-08-27 NOTE — TELEPHONE ENCOUNTER
Faxed the revised latter and office notes to [employer] - Attbrent Tejada  at  # [phone]     Princess Mukherjee RN on 8/27/2024 at 2:59 PM

## 2024-09-03 ENCOUNTER — VIRTUAL VISIT (OUTPATIENT)
Dept: OCCUPATIONAL THERAPY | Facility: CLINIC | Age: 34
End: 2024-09-03
Attending: PSYCHIATRY & NEUROLOGY
Payer: COMMERCIAL

## 2024-09-03 DIAGNOSIS — H53.9 VISUAL DISTURBANCE: Primary | ICD-10-CM

## 2024-09-03 PROCEDURE — 97535 SELF CARE MNGMENT TRAINING: CPT | Mod: GO,GT,95 | Performed by: OCCUPATIONAL THERAPIST

## 2024-10-21 NOTE — TELEPHONE ENCOUNTER
RECORDS RECEIVED FROM: Care Everywhere   REASON FOR VISIT: G43.709 (ICD-10-CM) - Chronic migraine without aura without status migrainosus, not intractable  R42 (ICD-10-CM) - Dizziness    PROVIDER: Lucila Diaz MD   DATE OF APPT: 11/27/24 @ 1:30 pm    NOTES (FOR ALL VISITS) STATUS DETAILS   OFFICE NOTE from referring provider Internal 8/2/24, 7/5/24, 5/24/24 [facility]    See Additional Encounters   OFFICE NOTE from other specialist Care Everywhere 10/8/24 [facility]  [facility]    5/21/24 Geri Loyola APRN CNP @[facility]-     MEDICATION LIST Internal    IMAGING  (FOR ALL VISITS)     MRI (HEAD, NECK, SPINE) Internal [facility]  8/27/24 MR Brain  8/7/23 MR Brain  8/7/23 MR Cervical Spine

## 2024-10-28 ENCOUNTER — DOCUMENTATION ONLY (OUTPATIENT)
Dept: INTERNAL MEDICINE | Facility: CLINIC | Age: 34
End: 2024-10-28
Payer: COMMERCIAL

## 2024-10-28 NOTE — PROGRESS NOTES
Type of Form Received: [employer] WC Eval    Form Received (Date) 10/25/24   Form Filled out Yes, faxed 11/1/24   Placed in provider folder Yes

## 2024-10-29 ENCOUNTER — VIRTUAL VISIT (OUTPATIENT)
Dept: INTERNAL MEDICINE | Facility: CLINIC | Age: 34
End: 2024-10-29
Payer: COMMERCIAL

## 2024-10-29 ENCOUNTER — TELEPHONE (OUTPATIENT)
Dept: INTERNAL MEDICINE | Facility: CLINIC | Age: 34
End: 2024-10-29

## 2024-10-29 DIAGNOSIS — F41.1 GENERALIZED ANXIETY DISORDER: ICD-10-CM

## 2024-10-29 DIAGNOSIS — G89.4 CHRONIC PAIN DISORDER: ICD-10-CM

## 2024-10-29 DIAGNOSIS — M25.551 CHRONIC HIP PAIN, BILATERAL: Primary | ICD-10-CM

## 2024-10-29 DIAGNOSIS — G89.29 CHRONIC HIP PAIN, BILATERAL: Primary | ICD-10-CM

## 2024-10-29 DIAGNOSIS — G43.709 CHRONIC MIGRAINE WITHOUT AURA WITHOUT STATUS MIGRAINOSUS, NOT INTRACTABLE: ICD-10-CM

## 2024-10-29 DIAGNOSIS — Z02.89 ENCOUNTER FOR COMPLETION OF FORM WITH PATIENT: ICD-10-CM

## 2024-10-29 DIAGNOSIS — K21.9 GASTROESOPHAGEAL REFLUX DISEASE, UNSPECIFIED WHETHER ESOPHAGITIS PRESENT: ICD-10-CM

## 2024-10-29 DIAGNOSIS — F33.1 MAJOR DEPRESSIVE DISORDER, RECURRENT EPISODE, MODERATE (H): ICD-10-CM

## 2024-10-29 DIAGNOSIS — M25.552 CHRONIC HIP PAIN, BILATERAL: Primary | ICD-10-CM

## 2024-10-29 DIAGNOSIS — Q79.60 EDS (EHLERS-DANLOS SYNDROME): ICD-10-CM

## 2024-10-29 PROCEDURE — 99215 OFFICE O/P EST HI 40 MIN: CPT | Mod: 95 | Performed by: NURSE PRACTITIONER

## 2024-10-29 ASSESSMENT — PATIENT HEALTH QUESTIONNAIRE - PHQ9
SUM OF ALL RESPONSES TO PHQ QUESTIONS 1-9: 14
SUM OF ALL RESPONSES TO PHQ QUESTIONS 1-9: 14
10. IF YOU CHECKED OFF ANY PROBLEMS, HOW DIFFICULT HAVE THESE PROBLEMS MADE IT FOR YOU TO DO YOUR WORK, TAKE CARE OF THINGS AT HOME, OR GET ALONG WITH OTHER PEOPLE: VERY DIFFICULT

## 2024-10-29 NOTE — PATIENT INSTRUCTIONS
Thank you for visiting the Primary Care Center today at the [facility]! The following is some information about our clinic:     Primary Care Center Frequently-Asked Questions    (1) How do I schedule appointments at the [facility]?     Primary Care--to schedule or make changes to an existing appointment, please call our primary care line at [phone].    Labs--to schedule a lab appointment at the [facility] you can use [employer] or call [phone]. If you have a [place] location that is closer to home, you can reach out to that location for scheduling options.     Imaging--if you need to schedule a CT, X-ray, MRI, ultrasound, or other imaging study you can call [phone] to schedule at the [facility] or any other [facility] imaging location.     Referrals--if a referral to another specialty was ordered you can expect a phone call from their scheduling team. If you have not heard from them in a week, please call us or send us a CaseStack message to check the status or get a scheduling number. Please note that this only applies to internal [facility] referrals. If the referral is external you would need to contact their office for scheduling.     (2) I have a question about my visit, who do I contact?     You can call us at the primary care line at [phone] to ask questions about your visit. You can also send a secure message through CaseStack, which is reviewed by clinic staff. Please note that [employer] messages have a twenty-four to forty-eight business hour turnaround time and should not be used for urgent concerns.    (3) How will I get the results of my tests?    If you are signed up for [employer] all tests will be released to you within twenty-four hours of resulting. Please allow three to five days for your doctor to review your results and place a note interpreting the results. If you do not have [employer] you will receive your  results through mail seven to ten business days following the return of the tests. Please note that if there should be any urgent or concerning results that your doctor or their registered nurse will reach out to you the same day as the tests come back. If you have follow up questions about your results or would like to discuss the results in detail please schedule a follow up with your provider either in person or virtually.     (4) How do I get refills of my prescriptions?     You should always first contact your pharmacy for refills of your medications. If submitting a refill request on [employer], please be sure to submit the request only once--repeat requests can cause delays in refill. If you are requesting a NEW medication or a medication related to new symptoms you will need to schedule an appointment with a provider prior to approval. Please note: Routine medication refills have up to one to three business day turnaround whereas controlled substances refills have up to five to seven business day turnaround.    (5) I have new symptoms, what do I do?     If you are having an immediate medical emergency, you should dial 911 for assistance.   For anything urgent that needs to be seen within a few hours to one day you should visit a local urgent care for assistance.  For non-urgent symptoms that need to be seen within a few days to a week you can schedule with an available provider in primary care by going to [employer] or calling [phone].   If you are not sure how serious your symptoms are or you would like to receive medical advice you can always call [phone] to speak with a triage nurse.

## 2024-10-29 NOTE — PROGRESS NOTES
"Екатерина is a 34 year old who is being evaluated via a billable video visit.    How would you like to obtain your AVS? MyChart  If the video visit is dropped, the invitation should be resent by: Text to cell phone: [phone]  Will anyone else be joining your video visit? No      Are refills needed on medications prescribed by this physician? NO    Aspen Nelson VVF      Assessment & Plan     Chronic hip pain, bilateral  She was referred to an orthopedic specialist at [place], they requested updated bilateral hip Xrays.    - XR Pelvis and Hip Bilateral 2 Views; Future    Gastroesophageal reflux disease, unspecified whether esophagitis present  Continues on Omeprazole; has follow-up scheduled with [facility].    Major depressive disorder, recurrent episode, moderate (H)  Generalized anxiety disorder  EDS (Harvey-Danlos syndrome)  Chronic migraine without aura without status migrainosus, not intractable  Chronic pain disorder  Worsened mood related to chronic health conditions; she has started Effexor for migraines, but would benefit from working with Health Psychology to help with depression/anxiety management.  No plan for self-harm.  Encouraged to continue with self-care such as swimming regularly, maintain good social support, making time for enjoyable activities.  Continue close follow-up with Neurology for migraine management; she will schedule the spine MRI at [place] as recommended by Neuro to evaluate for potential CSF leak.  - Adult Mental Health  Referral; Future    Encounter for completion of form with patient  Reviewed evaluation and orders from [employer].  Forms signed. See scanned media.  Agree with recommendations for Ultra light manual wheelchair with carbon fiber upgrade, 6x1.5\" soft roll casters, heel loops, wheel lock extension handles, height adjustable arm rests, rear anti-tippers, general use cushion, positioning backrest and smart drive power assist.  This will provide her with a " "mode of safe and independent mobility in her home and community.        BMI  Estimated body mass index is 29.71 kg/m  as calculated from the following:    Height as of 7/5/24: 1.635 m (5' 4.37\").    Weight as of 7/5/24: 79.4 kg (175 lb 1.6 oz).   Weight management plan: Discussed healthy diet and exercise guidelines      42 minutes spent by me on the date of the encounter doing chart review, history and exam, documentation and further activities per the note      Return in about 3 months (around 1/29/2025).    Debbie Willams is a 34 year old, presenting for the following health issues:  RECHECK and Wheelchair needs      Video Start Time: 8:36 AM    History of Present Illness       Reason for visit:  Getting a wheelchair    She eats 4 or more servings of fruits and vegetables daily.She consumes 0 sweetened beverage(s) daily.She exercises with enough effort to increase her heart rate 9 or less minutes per day.  She exercises with enough effort to increase her heart rate 3 or less days per week.   She is taking medications regularly.     Met with Neurology at [place] q6 m, didn't meet criteria for CSF leak, but hadn't tried multiple migraine treatments; plans to do a full spine MRI (needs to be scheduled); then may do a myelogram, but not sure about this (may cause CSF leak).  Dealing with debilitating migraines x9 years.   established with Neuro locally.  Can't drive right now d/t dizziness.     Seeing hip surgeon here since ~2010.   PT recommended follow-up, Ortho referred her to [place].  Needs an updated hip Xray.    Dr. Tucker. Would be bilateral surgery    Still on Omeprazole, hasn't been able to stop this.  Has f/up with [facility].      Working on wheelchair process x7-8 months.  Had appt at [facility], for PT wheelchair assessment, referred to store, fitted.  Needs form completed for power assist d/t shoulder injuries, can't self-propel for long distances, will allow for increased autonomy.    Has to lean down to hit " "power-assist, will have modifying device. Light weight so can lift into the car by herself.      URI in September--congestion, drainage, ears felt full.  Ears still pop, sensation not painful (left side still feels full).  Feels it's been a long time.  Doesn't want to see ENT d/t so many appts. Took sudafed x2 days.  Not bothering her that much, but still feels congested, snoring also.  Using saline nasal spray.    Venlafaxine 37.5    Leg pain is still there, comes and goes, currently not as severe as it was last time.  Stopped emgality, noticed improvement with this.     Depression--hard to live with such extreme head pain, dizziness.  Taking Effexor.  Does better when with people, has good social support.  Enjoys camping with her partner on weekends, but isn't able to help with set up.  Tries to keep busy.  Has a therapist from years ago, still has monthly phone check-ins.  Hasn't found a therapist locally.  Feels needs fun things/distraction more than \"another appt\". Had tried art therapy briefly, but therapist had \"no boundaries\".  Hard to manage the medical stuff all herself, feels down from trying to keep up with appointments. Family therapy with her parents, and getting ready to start couples therapy.  Tried the [employer] Rachna, felt it was implying that everything was just in her head.  Swims 5 days/week. Forces herself to get up and do it.  Feels hard to see time going by, wants to work. Applied for vocational rehab, got accepted, hopeful that this may help.    Has had some weight gain.   Normal weight is ~155 lbs, guess she's up to ~178 lbs, over past 6-7 months.  Suspects combination of not walking as much.  Feels really tired or sluggish.  Thyroid and iron studies were WNL in August  Was referred to Reproductive Endocrinologist for PCOS, prescribed Wegovy, made her really nauseated.  Stopped while getting started on Effexor.    Dizziness is most limiting.      Review of Systems  Constitutional, HEENT, " "cardiovascular, pulmonary, gi and gu systems are negative, except as otherwise noted.      Objective    Vitals - Patient Reported  Weight (Patient Reported): 80.3 kg (177 lb)  Height (Patient Reported): 162.6 cm (5' 4\")  BMI (Based on Pt Reported Ht/Wt): 30.38  Pain Score: Severe Pain (7)  Pain Loc: Head      Vitals:  No vitals were obtained today due to virtual visit.    Physical Exam   GENERAL: alert and no distress  EYES: Eyes grossly normal to inspection.  No discharge or erythema, or obvious scleral/conjunctival abnormalities.  RESP: No audible wheeze, cough, or visible cyanosis.    SKIN: Visible skin clear. No significant rash, abnormal pigmentation or lesions.  NEURO: Cranial nerves grossly intact.  Mentation and speech appropriate for age.  PSYCH: Appropriate affect, tone, and pace of words          Video-Visit Details    Type of service:  Video Visit   Video End Time:9:07 AM  Originating Location (pt. Location): Home    Distant Location (provider location):  Off-site  Platform used for Video Visit: Volodymyr  Signed Electronically by: MAJO Reyes CNP    "

## 2024-10-29 NOTE — NURSING NOTE
Depression Response    Patient completed the PHQ-9 assessment for depression and scored >9? Yes  Question 9 on the PHQ-9 was positive for suicidality? Yes  Does patient have current mental health provider? Yes    Is this a virtual visit? Yes   Does patient have suicidal ideation (positive question 9)? No - offer to place Mental Health Referral.  Patient declined referral/not needed    I personally notified the following: visit provider    Aspen OROZCO

## 2024-10-29 NOTE — TELEPHONE ENCOUNTER
Left Voicemail (1st Attempt) and Sent Mychart (1st Attempt) for the patient to call back and schedule the following:    Appointment type: VIDEO RETURN  Provider: PCP  Return date: 1/29/2025 on or around  Specialty phone number: [phone]  Additional appointment(s) needed: XRAY PELVIS +  BILATERAL HIP (please assist pt in scheduling by transferring to imaging # [phone])       Telephone Encounter by Marion Helms [employer]i [employer] Aydee at 07/20/18 01:04 PM     Author:  Marion Helms Beni [employer] Aydee Service:  (none) Author Type:  Certified Medical Assistant     Filed:  07/20/18 01:06 PM Encounter Date:  7/20/2018 Status:  Signed     :  Marion Helms Beni [employer] (Certified Medical Assistant)            Refilled per medication protocol.[HS1.1T] Epic[HS1.1M]      Revision History        User Key Date/Time User Provider Type Action    > HS1.1 07/20/18 01:06 PM Marion Helms Beni [employer] Certified Medical Assistant Sign    M - Manual, T - Template

## 2024-10-30 ENCOUNTER — MEDICAL CORRESPONDENCE (OUTPATIENT)
Dept: HEALTH INFORMATION MANAGEMENT | Facility: CLINIC | Age: 34
End: 2024-10-30
Payer: COMMERCIAL

## 2024-11-22 ENCOUNTER — TELEPHONE (OUTPATIENT)
Dept: NEUROLOGY | Facility: CLINIC | Age: 34
End: 2024-11-22
Payer: COMMERCIAL

## 2024-11-22 DIAGNOSIS — G43.701 CHRONIC MIGRAINE WITHOUT AURA WITH STATUS MIGRAINOSUS, NOT INTRACTABLE: ICD-10-CM

## 2024-11-22 RX ORDER — ZAVEGEPANT 10 MG/.1ML
1 SPRAY NASAL
Qty: 12 EACH | Refills: 11 | Status: CANCELLED | OUTPATIENT
Start: 2024-11-22

## 2024-11-22 NOTE — TELEPHONE ENCOUNTER
Prior Authorization Retail Medication Request    Medication/Dose: Zavegepant HCl (ZAVZPRET) 10 MG/ACT SOL   Diagnosis and ICD code (if different than what is on RX):  G43.401  New/renewal/insurance change PA/secondary ins. PA:  Previously Tried and Failed:    Rationale:      Insurance   Primary: [employer] [place]   Insurance ID:  [id number]     Secondary (if applicable):  Insurance ID:      Pharmacy Information (if different than what is on RX)  Name:    Phone:    Fax:    Clinic Information  Preferred routing pool for dept communication:
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
## 2024-11-26 NOTE — TELEPHONE ENCOUNTER
Central Prior Authorization Team  Phone: [phone]    PA Initiation    Medication: ZAVZPRET 10 MG/ACT NA SOLN  Insurance Company: [employer] - Phone [phone] Fax [phone]  Pharmacy Filling the Rx: [facility] -- [place], [place] - [address]  Filling Pharmacy Phone: [phone]  Filling Pharmacy Fax: [phone]  Start Date: 11/26/2024

## 2024-11-27 ENCOUNTER — OFFICE VISIT (OUTPATIENT)
Dept: NEUROLOGY | Facility: CLINIC | Age: 34
End: 2024-11-27
Attending: PSYCHIATRY & NEUROLOGY
Payer: COMMERCIAL

## 2024-11-27 ENCOUNTER — PRE VISIT (OUTPATIENT)
Dept: NEUROLOGY | Facility: CLINIC | Age: 34
End: 2024-11-27

## 2024-11-27 VITALS
BODY MASS INDEX: 29.61 KG/M2 | OXYGEN SATURATION: 98 % | HEART RATE: 94 BPM | WEIGHT: 177.7 LBS | DIASTOLIC BLOOD PRESSURE: 85 MMHG | SYSTOLIC BLOOD PRESSURE: 122 MMHG | HEIGHT: 65 IN | RESPIRATION RATE: 16 BRPM

## 2024-11-27 DIAGNOSIS — R42 VERTIGO: ICD-10-CM

## 2024-11-27 DIAGNOSIS — R10.84 ABDOMINAL PAIN, GENERALIZED: ICD-10-CM

## 2024-11-27 DIAGNOSIS — I87.1 COMPRESSION OF VEIN: Primary | ICD-10-CM

## 2024-11-27 DIAGNOSIS — I77.4 MEDIAN ARCUATE LIGAMENT SYNDROME (H): ICD-10-CM

## 2024-11-27 DIAGNOSIS — I87.1 MAY-THURNER SYNDROME: ICD-10-CM

## 2024-11-27 DIAGNOSIS — G54.0 TOS (THORACIC OUTLET SYNDROME): ICD-10-CM

## 2024-11-27 DIAGNOSIS — I87.1 NUTCRACKER PHENOMENON OF RENAL VEIN: ICD-10-CM

## 2024-11-27 DIAGNOSIS — M54.2 NECK PAIN: ICD-10-CM

## 2024-11-27 DIAGNOSIS — N94.89 PELVIC CONGESTION SYNDROME: ICD-10-CM

## 2024-11-27 DIAGNOSIS — R42 DIZZINESS: ICD-10-CM

## 2024-11-27 DIAGNOSIS — G43.709 CHRONIC MIGRAINE WITHOUT AURA WITHOUT STATUS MIGRAINOSUS, NOT INTRACTABLE: ICD-10-CM

## 2024-11-27 PROCEDURE — 99417 PROLNG OP E/M EACH 15 MIN: CPT | Performed by: PSYCHIATRY & NEUROLOGY

## 2024-11-27 PROCEDURE — 99215 OFFICE O/P EST HI 40 MIN: CPT | Performed by: PSYCHIATRY & NEUROLOGY

## 2024-11-27 PROCEDURE — G2211 COMPLEX E/M VISIT ADD ON: HCPCS | Performed by: PSYCHIATRY & NEUROLOGY

## 2024-11-27 RX ORDER — VENLAFAXINE HYDROCHLORIDE 37.5 MG/1
37.5 CAPSULE, EXTENDED RELEASE ORAL DAILY
COMMUNITY

## 2024-11-27 RX ORDER — TIRZEPATIDE 2.5 MG/.5ML
2.5 INJECTION, SOLUTION SUBCUTANEOUS WEEKLY
COMMUNITY
Start: 2024-11-26

## 2024-11-27 ASSESSMENT — PAIN SCALES - GENERAL: PAINLEVEL_OUTOF10: SEVERE PAIN (6)
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
## 2024-11-27 NOTE — TELEPHONE ENCOUNTER
Prior Authorization Approval    Medication: ZAVZPRET 10 MG/ACT NA SOLN  Authorization Effective Date: 8/29/2024  Authorization Expiration Date: 11/27/2025  Approved Dose/Quantity: 8 each/ month  Reference #:     Insurance Company: [employer] - Phone [phone] Fax [phone]  Expected CoPay: $    CoPay Card Available:      Financial Assistance Needed:   Which Pharmacy is filling the prescription: [facility] -- [facility] - [place], [place] - [address]  Pharmacy Notified: Yes  Patient Notified: **Instructed pharmacy to notify patient when script is ready to /ship.**

## 2024-11-27 NOTE — NURSING NOTE
"Chief Complaint   Patient presents with    New Patient     /85 (BP Location: Right arm, Patient Position: Sitting, Cuff Size: Adult Large)   Pulse 94   Resp 16   Ht 1.64 m (5' 4.57\")   Wt 80.6 kg (177 lb 11.2 oz)   SpO2 98%   BMI 29.97 kg/m      ANAMARIA VEE    "

## 2024-11-27 NOTE — PATIENT INSTRUCTIONS
Please call [phone] to schedule the CTV of the head and neck and the ultrasound at the [facility] ([facility] [place] [address] [place] [address], [place]. [address]). The Radiology department is on the 1st floor of the [facility]. This is the same building where we met for our appointment. Please note that these tests can only be done at the [facility] because they involve some extra neck positions that are not examined at the other imaging centers.     The two studies can be scheduled back-to-back on the same day. You may be more comfortable doing the ultrasound first as it is the longer study (about 45-minutes). The CTV will take about 15-minutes and involves the temporary placement of an IV for the contrast injection. Having the IV bandage from the CTV may make the ultrasound less comfortable for you.

## 2024-12-16 ENCOUNTER — TELEPHONE (OUTPATIENT)
Dept: NEUROLOGY | Facility: CLINIC | Age: 34
End: 2024-12-16
Payer: COMMERCIAL

## 2024-12-16 NOTE — CONFIDENTIAL NOTE
Patient called to report an adverse reaction to a new medication.  She reported a severe headache which started 2-3 days ago and worsened over time. She tried Zavzpret for the first time and felt it worsened her headache. She felt a lot of pressure in her face and sinuses.  Her headache is 9/10 and she is very nauseous.  I advised her to take Zofran for her nausea and start taking medrol Dosepak as her headache continued for 2-3 days.  She mentioned she had medrol Dosepak at home and she could use it.       Lindsay Zepead MD

## 2024-12-23 ENCOUNTER — ANCILLARY PROCEDURE (OUTPATIENT)
Dept: ULTRASOUND IMAGING | Facility: CLINIC | Age: 34
End: 2024-12-23
Attending: PSYCHIATRY & NEUROLOGY
Payer: COMMERCIAL

## 2024-12-23 ENCOUNTER — ANCILLARY PROCEDURE (OUTPATIENT)
Dept: CT IMAGING | Facility: CLINIC | Age: 34
End: 2024-12-23
Attending: PSYCHIATRY & NEUROLOGY
Payer: COMMERCIAL

## 2024-12-23 DIAGNOSIS — I87.1 COMPRESSION OF VEIN: ICD-10-CM

## 2024-12-23 DIAGNOSIS — R42 VERTIGO: ICD-10-CM

## 2024-12-23 DIAGNOSIS — R10.84 ABDOMINAL PAIN, GENERALIZED: ICD-10-CM

## 2024-12-23 DIAGNOSIS — M54.2 NECK PAIN: ICD-10-CM

## 2024-12-23 DIAGNOSIS — I77.4 MEDIAN ARCUATE LIGAMENT SYNDROME (H): ICD-10-CM

## 2024-12-23 DIAGNOSIS — G54.0 TOS (THORACIC OUTLET SYNDROME): ICD-10-CM

## 2024-12-23 DIAGNOSIS — G43.709 CHRONIC MIGRAINE WITHOUT AURA WITHOUT STATUS MIGRAINOSUS, NOT INTRACTABLE: Primary | ICD-10-CM

## 2024-12-23 PROCEDURE — 70496 CT ANGIOGRAPHY HEAD: CPT | Performed by: RADIOLOGY

## 2024-12-23 PROCEDURE — 93922 UPR/L XTREMITY ART 2 LEVELS: CPT | Mod: GC | Performed by: RADIOLOGY

## 2024-12-23 PROCEDURE — 70498 CT ANGIOGRAPHY NECK: CPT | Performed by: RADIOLOGY

## 2024-12-23 PROCEDURE — 93975 VASCULAR STUDY: CPT | Mod: GC | Performed by: STUDENT IN AN ORGANIZED HEALTH CARE EDUCATION/TRAINING PROGRAM

## 2024-12-23 PROCEDURE — 93970 EXTREMITY STUDY: CPT | Mod: GC | Performed by: RADIOLOGY

## 2024-12-23 RX ORDER — IOPAMIDOL 755 MG/ML
70 INJECTION, SOLUTION INTRAVASCULAR ONCE
Status: COMPLETED | OUTPATIENT
Start: 2024-12-23 | End: 2024-12-23

## 2024-12-23 RX ADMIN — IOPAMIDOL 70 ML: 755 INJECTION, SOLUTION INTRAVASCULAR at 11:25

## 2024-12-23 NOTE — DISCHARGE INSTRUCTIONS

## 2024-12-27 ENCOUNTER — MYC MEDICAL ADVICE (OUTPATIENT)
Dept: INTERNAL MEDICINE | Facility: CLINIC | Age: 34
End: 2024-12-27
Payer: COMMERCIAL

## 2024-12-27 DIAGNOSIS — H69.90 DYSFUNCTION OF EUSTACHIAN TUBE, UNSPECIFIED LATERALITY: Primary | ICD-10-CM

## 2024-12-31 ENCOUNTER — TELEPHONE (OUTPATIENT)
Dept: NEUROLOGY | Facility: CLINIC | Age: 34
End: 2024-12-31
Payer: COMMERCIAL

## 2024-12-31 DIAGNOSIS — R51.9 CHRONIC DAILY HEADACHE: Primary | ICD-10-CM

## 2024-12-31 RX ORDER — METHYLPREDNISOLONE 4 MG/1
TABLET ORAL
Qty: 21 TABLET | Refills: 0 | Status: SHIPPED | OUTPATIENT
Start: 2024-12-31

## 2024-12-31 NOTE — TELEPHONE ENCOUNTER
"Caller reporting the following red-flag symptom(s): Worst headache/pain    Patient sent this message to Dr Amaya in the [employer] message from 12/23/2024:     \"Hi, I have a number of questions for you and  but right now. I am in a pretty acute situation. I m not sure if I m supposed to reach out to you or Dr Diaz.   My head pain and pressure is really bad and some of the worst pain I ve ever had. Just for reference it has been really bad since the CVT, it seemed to be getting better the past three days but today it took a turn for the worse. I ve upped my Venlafaxine to 75 mg. I ve taken Nurtec and Lasik wondering if I should go into urgent care and if so, what to ask for. Not sure what to do wondering if an on call doctor can call me.\"    Per the system red-flag symptom policy, patient was instructed to:  speak with a Registered Nurse    Action:  Refuses to speak with a Registered Nurse and wants a message sent to their provider.    Patient requesting to hear back from the provider directly as this is a \"complex situation\" and mentions that if the clinic is not able to reach the patient at [phone], as they may be too \"out of it\" to answer, if the clinic can reach out to their spouse at [phone] (OK to LVM on both phone numbers)   "

## 2024-12-31 NOTE — TELEPHONE ENCOUNTER
Returned pt call. Pt states that this does not feel like a migraine but more of extreme pressure in her head. She said she has had pain since her CTV. She thought it was getting better but it work her from her sleep last night. He pressure is mostly behind her eye. She has taken lasix x1 and Nurtec x1. I advised her to take her lasix Q6 hours to help relieve pressure. The pt is hesitant to go to the ED because she does not want to receive fluids since she believes excess fluid is causing her symptoms. She has suffered migraines her whole life and states that this feels like something different. She denies any vision changes, weakness, or dizziness. She says she is fatigued and in a lot of pain.

## 2025-01-19 NOTE — PROGRESS NOTES
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  Her most bothersome current symptoms had started immediately at the time of compression of the TMJ but prior that she had experienced jaw tightness.  Symptoms now include neck pain, head pressure, rocking vertigo, and visual problems that include double vision, blurred vision, and eye pain.  This has affected her ability to read. Head pressure is worse on the right side, around the eye, and is worse with leaning over, bending over, having her head down, and lying down. She has dizziness characterized as a rocking sensation as if she is on a boat. This feeling is worse with head movement but especially turning to the right. There is severe neck sensitivity and a feeling of neck instability. She has had brain fog, light sensitivity, auditory processing. She has a history of chronic abdominal bloating and pain after eating.      On exam she has diffuse neck, shoulder girdle, thoracic outlet, and atlas tenderness. She has a Beighton score of 4 out of 10, indicating hypermobility.      She has been in PT since age 15 for low back and hip pain, shoulder dislocations, frequent orthopedic injuries that are consistent with this hypermobility.   She has tried a number of treatments in the interim including 3 triptans, Ubrelvy, cyclobenzaprine, Toradol as well as a number of preventative treatments including gabapentin Qulipta, venlafaxine, topiramate, rimegepant, and Nerivio. None have been significantly helpful.      She is getting an evaluation at [facility] for a possible CSF leak, which is a good idea.  We can provide complementary evaluation for whether she might have a high pressure syndrome from extracranial venous compression. These compressions are usually in the neck (atlas, mid-neck, thoracic outlet) and/or in the abdomen and pelvis. The compressions are typically dynamic, multifocal, and bilateral when the symptoms are severe. Neck pain is very typical of venous congestion syndromes since obstructed internal jugular and subclavian veins lead to shunting to the paravertebral venous plexus.      Recommendations:  US TOS/internal jugular vein   CT venogram head and neck head flexion  Will need a second level CTV with head turned to right and left as it may show different compressions.   US abdomen to rule out median arcuate ligament syndrome  CT venogram abdomen/pelvis to rule out left renal vein (Nutcracker) and left common iliac vein compression (May-Thurner) syndrome  Will need physical therapy--1st rib mobilization, anterior scalene stretches, ergonomic awareness  Depending on where the compressions are, she may need a referral to interventional radiology, a trial of neurotoxin for cervical dystonia, or a vascular surgery referral  Follow-up after imaging    Interim History 1/22/2025:  ULTRASOUND UPPER EXTREMITY VEIN AND PPG THORACIC OUTLET SYNDROME BILATERAL 12/23/2024  RIGHT       MID SUBCLAVIAN VEIN, sitting upright:            0 degrees: 63 cm/s, phasic            90 degrees: 38 cm/s, phasic         MID INTERNAL JUGULAR VEIN - patient sitting upright             Neutral: 158 cm/s             Right: 163 cm/s, mild waveform dampening    LEFT       MID SUBCLAVIAN VEIN, sitting upright:            0 degrees: 17 cm/s, phasic            90 degrees: 177 cm/s, phasic         MID INTERNAL JUGULAR VEIN - patient sitting upright:              Neutral: 123 cm/s             Right: 73 cm/s             Left: No flow/flow cessation    ABDOMINAL COMPLETE WITH DOPPLER ULTRASOUND 12/24/2024  1.  Peak systolic velocities  in the celiac artery at the proximal/mid  segment measure 264 cm/s, suggesting greater than 50% stenosis based    CTV HEAD NECK W CONTRAST, 12/23/2024   IMPRESSION:   1. Mild narrowing of the upper right internal jugular vein anterior to the C1 transverse process with neck flexion. Also mild narrowing of  the upper left internal jugular vein anterior to the C1 transverse process in the neutral position, increasing with flexion. The  physiologic/clinical significance of this finding is uncertain and can be seen in the absence of symptoms.  2. Patent dural venous sinuses and major deep intracranial veins.    CTV with head turning to diagnose compression of the IJV under the SCM and CTV abdomen/pelvis to work up the celiac artery compression were ordered.  Patient had severe headache after the 1st CTV on 12-23-24, Lasix and Nurtec used. Headache increased the same day after the CTV, last for about 10 days. The only thing that helped was the Lasix, which she has been taking 3x 20mg. When she doesn't take it, there is more head pressure.   Thus, other CTVs were not scheduled.   There is constant throat pressure.    We discussed a plan forward given her worsened headache with the last CTV.   The CTV with the head turning can be very helpful to confirm entrapment of the IJV from overlying muscles.  Since she had a headache flare with the last contrast load, using a lower osmolarity agent may help. She can also more liberally use the Lasix after the next study.  Finally, we will start some methazolamide to see whether it helps with baseline head pressure.     PLAN:  CTV with head turning, Imaging with request to use Visipaque, which is iso-osmolar  Then CTA/CTV of the abdomen  Lasix 20mg TID  Start methazolamide titration.   3.   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   Is getting evaluation CSF leak evaluation at [place]    DATA:  I personally reviewed the following  data.    CTV HEAD NECK W CONTRAST, 12/23/2024      HISTORY:  34F with headache, vertigo, neck pain. Question venous  compression, Styloid process.; Compression of vein; Neck pain;  Vertigo.     FINDINGS:  No thrombosis or stenosis of the major intracranial dural  sinuses or deep cerebral veins. Dominant right venous drainage.     The right styloid process measures 8 mm with calcification of the  stylohyoid ligament along a length of 15 mm. The left styloid process  measures 9 mm with calcification of the stylohyoid ligament along a  length of 15 mm.     RIGHT:  In the neutral position, there is no significant narrowing of the  right internal jugular vein anterior to the C1 transverse process with  a diameter of 6 mm. With neck flexion, there is mild narrowing with  residual lumen diameter of 3 mm.     LEFT:  In the neutral position, there is mild narrowing of the upper left  internal jugular vein anterior to the C1 transverse process with a  diameter of 4 mm. With neck flexion, there is increase in the degree  of narrowing with a residual diameter of 3 mm.     No paravertebral venous distention in the neutral position or with  neck flexion.     The imaged skull base, intracranial and orbital structures are within  normal limits. No suspicious finding in the visualized superior  mediastinum/thorax. Clear lung apices.                                                                 IMPRESSION:   1. Mild narrowing of the upper right internal jugular vein anterior to  the C1 transverse process with neck flexion. Also mild narrowing of  the upper left internal jugular vein anterior to the C1 transverse  process in the neutral position, increasing with flexion. The  physiologic/clinical significance of this finding is uncertain and can  be seen in the absence of symptoms.  2. Patent dural venous sinuses and major deep intracranial veins.    ULTRASOUND UPPER EXTREMITY VEIN AND PPG THORACIC OUTLET SYNDROME BILATERAL  12/23/2024   FINDINGS:  RIGHT:       REST:            INTERNAL JUGULAR VEIN: 123 cm/s, phasic            INNOMINATE VEIN: 71 cm/s, phasic            SUBCLAVIAN VEIN, medial: 124 cm/s, phasic            SUBCLAVIAN VEIN, mid: 79 cm/s, phasic, fully compressible            SUBCLAVIAN VEIN, lateral: 87 cm/s, phasic, fully  compressible            AXILLARY VEIN: 90 cm/s, phasic, fully compressible         MID SUBCLAVIAN VEIN, sitting upright:            0 degrees: 63 cm/s, phasic            90 degrees: 38 cm/s, phasic            135 degrees: 118 cm/s, phasic            180 degrees: 13 cm/s, phasic         MID INTERNAL JUGULAR VEIN - patient sitting upright             Neutral: 158 cm/s             Right: 163 cm/s, mild waveform dampening             Left: 191 cm/s, mild waveform dampening             Extend: 235 cm/s, mild waveform dampening             Flexed: 186 cm/s, mild waveform dampening         PPGs:            Baseline: Normal            Arms 90: mildly diminished              Arms 180: mildly diminished            : mildly diminished             head right: mildly diminished             head left: diminished    LEFT:       REST:            INTERNAL JUGULAR VEIN: 78 cm/s, phasic            INNOMINATE VEIN: 69 cm/s, phasic            SUBCLAVIAN VEIN, medial: 71 cm/s, phasic            SUBCLAVIAN VEIN, mid: 53 cm/s, phasic, fully compressible            SUBCLAVIAN VEIN, lateral: 59 cm/s, phasic, fully  compressible            AXILLARY VEIN: 90 cm/s, phasic, fully compressible         MID SUBCLAVIAN VEIN, sitting upright:            0 degrees: 17 cm/s, phasic            90 degrees: 177 cm/s, phasic            135 degrees: 113 cm/s, phasic            180 degrees: 36 cm/s, phasic         MID INTERNAL JUGULAR VEIN - patient sitting upright:              Neutral: 123 cm/s             Right: 73 cm/s             Left: No flow/flow cessation             Extend: 122 cm/s             Flexed: 67  cm/s        PPGs:            Baseline: Normal            Arms 90: Normal            Arms 180: Severely dampened/near occlusion            : Mild dampening             head right: Normal             head left: Normal  Impression: IMPRESSION:   1. RIGHT:       A. No subclavian venous stenosis at rest. No cessation of  subclavian vein blood flow with maneuvers.        B. Mildly elevated velocities and waveform dampening affecting  the internal jugular vein with maneuvers as above.        C. Mild dampening of arterial waveforms with maneuvers as above.    2. LEFT:       A. No subclavian venous stenosis at rest. No cessation of  subclavian vein blood flow with maneuvers.       B. Cessation of blood flow in the left internal jugular vein with  head rotation to left as noted above.       C. Marked dampening of PPG waveform with 180 degrees abduction  position.    ABDOMINAL COMPLETE WITH DOPPLER ULTRASOUND 12/24/2024    FINDINGS:  AORTA, supraceliac: 140/89 cm/s, triphasic  AORTA, infrarenal proximal: 161 cm/s, triphasic  AORTA, infrarenal mid: 156 cm/s, triphasic  AORTA, infrarenal distal: 128 cm/s, triphasic    CELIAC ARTERY, origin (holding inspiration): 116/32 cm/s, monophasic  CELIAC ARTERY, origin (holding expiration): Not visualized  CELIAC ARTERY, proximal/mid: 264/87 cm/s, monophasic  CELIAC ARTERY, distal: 229/100 cm/s, monophasic    SUPERIOR MESENTERIC ARTERY, origin: 111/35 cm/s, monophasic  SUPERIOR MESENTERIC ARTERY, proximal: 228/29 cm/s, triphasic  SUPERIOR MESENTERIC ARTERY, mid: 216/29 cm/s, triphasic  SUPERIOR MESENTERIC ARTERY, distal: 145 cm/s, triphasic    INFERIOR MESENTERIC ARTERY, origin: Obscured  INFERIOR MESENTERIC ARTERY, proximal: Obscured    HEPATIC ARTERY, anne-marie hepatis: 63/19 cm/s, monophasic  SPLENIC ARTERY, proximal: 192/70 cm/s, monophasic  Impression: IMPRESSION:   1.  Peak systolic velocities in the celiac artery at the proximal/mid  segment measure 264 cm/s,  suggesting greater than 50% stenosis based  on duplex ultrasound criteria. See guidelines below.  Celiac artery  could not be visualized on expiration.  2.  Superior mesenteric artery demonstrates triphasic flow pattern  throughout, with no evidence of significant stenosis.  3.  Limited evaluation of the inferior mesenteric artery origin and  proximal segment due to obscuration.    Guidelines:   Peak Systolic Velocity Criteria:       Celiac Artery > 50% stenosis: > 240 cm/s       Celiac Artery > 70% stenosis: > 320 cm/s         Superior Mesenteric Artery > 50% stenosis: > 295 cm/s       Superior Mesenteric Artery > 70% stenosis: > 400 cm/s    Makenna et al. Mesenteric/celiac duplex ultrasound interpretation  criteria revisited. Journal of Vascular Surgery. 2012; 55:428-36.    I have personally reviewed the examination and initial interpretation  and I agree with the findings.    DEANNA MARISCAL MD         SYSTEM ID:  [id number]      The longitudinal plan of care for the diagnosis(es)/condition(s) as documented were addressed during this visit. Due to the added complexity in care, I will continue to support Екатерина in the subsequent management and with ongoing continuity of care.    38-minutes were spent in evaluation, counseling, and documentation on the date of service.

## 2025-01-22 ENCOUNTER — OFFICE VISIT (OUTPATIENT)
Dept: NEUROLOGY | Facility: CLINIC | Age: 35
End: 2025-01-22
Payer: COMMERCIAL

## 2025-01-22 VITALS
DIASTOLIC BLOOD PRESSURE: 78 MMHG | OXYGEN SATURATION: 94 % | RESPIRATION RATE: 14 BRPM | HEART RATE: 90 BPM | SYSTOLIC BLOOD PRESSURE: 123 MMHG

## 2025-01-22 DIAGNOSIS — I87.1 NUTCRACKER PHENOMENON OF RENAL VEIN: ICD-10-CM

## 2025-01-22 DIAGNOSIS — R60.0 LOCAL EDEMA: ICD-10-CM

## 2025-01-22 DIAGNOSIS — I87.1 MAY-THURNER SYNDROME: ICD-10-CM

## 2025-01-22 DIAGNOSIS — M43.6 CONTRACTURE OF STERNOCLEIDOMASTOID MUSCLE: ICD-10-CM

## 2025-01-22 DIAGNOSIS — I87.1 COMPRESSION OF VEIN: ICD-10-CM

## 2025-01-22 DIAGNOSIS — G43.709 CHRONIC MIGRAINE WITHOUT AURA WITHOUT STATUS MIGRAINOSUS, NOT INTRACTABLE: Primary | ICD-10-CM

## 2025-01-22 DIAGNOSIS — G54.0 TOS (THORACIC OUTLET SYNDROME): ICD-10-CM

## 2025-01-22 DIAGNOSIS — R42 VERTIGO: ICD-10-CM

## 2025-01-22 DIAGNOSIS — G24.3 CERVICAL DYSTONIA: ICD-10-CM

## 2025-01-22 DIAGNOSIS — G93.2 INTRACRANIAL HYPERTENSION: ICD-10-CM

## 2025-01-22 RX ORDER — METHAZOLAMIDE 25 MG/1
TABLET ORAL
Qty: 42 TABLET | Refills: 3 | Status: SHIPPED | OUTPATIENT
Start: 2025-01-22

## 2025-01-22 RX ORDER — FUROSEMIDE 20 MG/1
20 TABLET ORAL EVERY 6 HOURS PRN
Qty: 90 TABLET | Refills: 3 | Status: SHIPPED | OUTPATIENT
Start: 2025-01-22

## 2025-01-22 RX ORDER — VENLAFAXINE HYDROCHLORIDE 75 MG/1
75 TABLET, EXTENDED RELEASE ORAL DAILY
COMMUNITY

## 2025-01-22 ASSESSMENT — PAIN SCALES - GENERAL: PAINLEVEL_OUTOF10: MODERATE PAIN (4)

## 2025-01-22 NOTE — LETTER
1/22/2025       RE: Екатерина Lucero  [address]  [facility] [address]     Dear Colleague,    Thank you for referring your patient, Екатерина Lucero, to the [facility] NEUROLOG[facility]. Please see a copy of my visit note below.    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  Her most bothersome current symptoms had started immediately at the time of compression of the TMJ but prior that she had experienced jaw tightness.  Symptoms now include neck pain, head pressure, rocking vertigo, and visual problems that include double vision, blurred vision, and eye pain.  This has affected her ability to read. Head pressure is worse on the right side, around the eye, and is worse with leaning over, bending over, having her head down, and lying down. She has dizziness characterized as a rocking sensation as if she is on a boat. This feeling is worse with head movement but especially turning to the right. There is severe neck sensitivity and a feeling of neck instability. She has had brain fog, light sensitivity, auditory processing. She has a history of chronic abdominal bloating and pain after eating.      On exam she has diffuse neck, shoulder girdle, thoracic outlet, and atlas tenderness. She has a Beighton score of 4 out of 10, indicating hypermobility.      She has been in PT since age 15 for low back and hip pain, shoulder dislocations, frequent orthopedic injuries that are consistent with this hypermobility.   She has tried a number of treatments in the  interim including 3 triptans, Ubrelvy, cyclobenzaprine, Toradol as well as a number of preventative treatments including gabapentin Qulipta, venlafaxine, topiramate, rimegepant, and Nerivio. None have been significantly helpful.      She is getting an evaluation at [place] for a possible CSF leak, which is a good idea. We can provide complementary evaluation for whether she might have a high pressure syndrome from extracranial venous compression. These compressions are usually in the neck (atlas, mid-neck, thoracic outlet) and/or in the abdomen and pelvis. The compressions are typically dynamic, multifocal, and bilateral when the symptoms are severe. Neck pain is very typical of venous congestion syndromes since obstructed internal jugular and subclavian veins lead to shunting to the paravertebral venous plexus.      Recommendations:  US TOS/internal jugular vein   CT venogram head and neck head flexion  Will need a second level CTV with head turned to right and left as it may show different compressions.   US abdomen to rule out median arcuate ligament syndrome  CT venogram abdomen/pelvis to rule out left renal vein (Nutcracker) and left common iliac vein compression (May-Thurner) syndrome  Will need physical therapy--1st rib mobilization, anterior scalene stretches, ergonomic awareness  Depending on where the compressions are, she may need a referral to interventional radiology, a trial of neurotoxin for cervical dystonia, or a vascular surgery referral  Follow-up after imaging    Interim History 1/22/2025:  ULTRASOUND UPPER EXTREMITY VEIN AND PPG THORACIC OUTLET SYNDROME BILATERAL 12/23/2024  RIGHT       MID SUBCLAVIAN VEIN, sitting upright:            0 degrees: 63 cm/s, phasic            90 degrees: 38 cm/s, phasic         MID INTERNAL JUGULAR VEIN - patient sitting upright             Neutral: 158 cm/s             Right: 163 cm/s, mild waveform dampening    LEFT       MID SUBCLAVIAN VEIN, sitting upright:             0 degrees: 17 cm/s, phasic            90 degrees: 177 cm/s, phasic         MID INTERNAL JUGULAR VEIN - patient sitting upright:              Neutral: 123 cm/s             Right: 73 cm/s             Left: No flow/flow cessation    ABDOMINAL COMPLETE WITH DOPPLER ULTRASOUND 12/24/2024  1.  Peak systolic velocities in the celiac artery at the proximal/mid  segment measure 264 cm/s, suggesting greater than 50% stenosis based    CTV HEAD NECK W CONTRAST, 12/23/2024   IMPRESSION:   1. Mild narrowing of the upper right internal jugular vein anterior to the C1 transverse process with neck flexion. Also mild narrowing of  the upper left internal jugular vein anterior to the C1 transverse process in the neutral position, increasing with flexion. The  physiologic/clinical significance of this finding is uncertain and can be seen in the absence of symptoms.  2. Patent dural venous sinuses and major deep intracranial veins.    CTV with head turning to diagnose compression of the IJV under the SCM and CTV abdomen/pelvis to work up the celiac artery compression were ordered.  Patient had severe headache after the 1st CTV on 12-23-24, Lasix and Nurtec used. Headache increased the same day after the CTV, last for about 10 days. The only thing that helped was the Lasix, which she has been taking 3x 20mg. When she doesn't take it, there is more head pressure.   Thus, other CTVs were not scheduled.   There is constant throat pressure.    We discussed a plan forward given her worsened headache with the last CTV.   The CTV with the head turning can be very helpful to confirm entrapment of the IJV from overlying muscles.  Since she had a headache flare with the last contrast load, using a lower osmolarity agent may help. She can also more liberally use the Lasix after the next study.  Finally, we will start some methazolamide to see whether it helps with baseline head pressure.     PLAN:  CTV with head turning, Imaging with request to  use Visipaque, which is iso-osmolar  Then CTA/CTV of the abdomen  Lasix 20mg TID  Start methazolamide titration.   3.   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   Is getting evaluation CSF leak evaluation at [place]    DATA:  I personally reviewed the following data.    CTV HEAD NECK W CONTRAST, 12/23/2024      HISTORY:  34F with headache, vertigo, neck pain. Question venous  compression, Styloid process.; Compression of vein; Neck pain;  Vertigo.     FINDINGS:  No thrombosis or stenosis of the major intracranial dural  sinuses or deep cerebral veins. Dominant right venous drainage.     The right styloid process measures 8 mm with calcification of the  stylohyoid ligament along a length of 15 mm. The left styloid process  measures 9 mm with calcification of the stylohyoid ligament along a  length of 15 mm.     RIGHT:  In the neutral position, there is no significant narrowing of the  right internal jugular vein anterior to the C1 transverse process with  a diameter of 6 mm. With neck flexion, there is mild narrowing with  residual lumen diameter of 3 mm.     LEFT:  In the neutral position, there is mild narrowing of the upper left  internal jugular vein anterior to the C1 transverse process with a  diameter of 4 mm. With neck flexion, there is increase in the degree  of narrowing with a residual diameter of 3 mm.     No paravertebral venous distention in the neutral position or with  neck flexion.     The imaged skull base, intracranial and orbital structures are within  normal limits. No suspicious finding in the visualized superior  mediastinum/thorax. Clear lung apices.                                                                 IMPRESSION:   1. Mild narrowing of the upper right internal jugular vein anterior to  the C1 transverse process with neck flexion. Also mild narrowing of  the upper left internal jugular vein anterior to the C1 transverse  process in the  neutral position, increasing with flexion. The  physiologic/clinical significance of this finding is uncertain and can  be seen in the absence of symptoms.  2. Patent dural venous sinuses and major deep intracranial veins.    ULTRASOUND UPPER EXTREMITY VEIN AND PPG THORACIC OUTLET SYNDROME BILATERAL 12/23/2024   FINDINGS:  RIGHT:       REST:            INTERNAL JUGULAR VEIN: 123 cm/s, phasic            INNOMINATE VEIN: 71 cm/s, phasic            SUBCLAVIAN VEIN, medial: 124 cm/s, phasic            SUBCLAVIAN VEIN, mid: 79 cm/s, phasic, fully compressible            SUBCLAVIAN VEIN, lateral: 87 cm/s, phasic, fully  compressible            AXILLARY VEIN: 90 cm/s, phasic, fully compressible         MID SUBCLAVIAN VEIN, sitting upright:            0 degrees: 63 cm/s, phasic            90 degrees: 38 cm/s, phasic            135 degrees: 118 cm/s, phasic            180 degrees: 13 cm/s, phasic         MID INTERNAL JUGULAR VEIN - patient sitting upright             Neutral: 158 cm/s             Right: 163 cm/s, mild waveform dampening             Left: 191 cm/s, mild waveform dampening             Extend: 235 cm/s, mild waveform dampening             Flexed: 186 cm/s, mild waveform dampening         PPGs:            Baseline: Normal            Arms 90: mildly diminished              Arms 180: mildly diminished            : mildly diminished             head right: mildly diminished             head left: diminished    LEFT:       REST:            INTERNAL JUGULAR VEIN: 78 cm/s, phasic            INNOMINATE VEIN: 69 cm/s, phasic            SUBCLAVIAN VEIN, medial: 71 cm/s, phasic            SUBCLAVIAN VEIN, mid: 53 cm/s, phasic, fully compressible            SUBCLAVIAN VEIN, lateral: 59 cm/s, phasic, fully  compressible            AXILLARY VEIN: 90 cm/s, phasic, fully compressible         MID SUBCLAVIAN VEIN, sitting upright:            0 degrees: 17 cm/s, phasic            90 degrees: 177 cm/s,  phasic            135 degrees: 113 cm/s, phasic            180 degrees: 36 cm/s, phasic         MID INTERNAL JUGULAR VEIN - patient sitting upright:              Neutral: 123 cm/s             Right: 73 cm/s             Left: No flow/flow cessation             Extend: 122 cm/s             Flexed: 67 cm/s        PPGs:            Baseline: Normal            Arms 90: Normal            Arms 180: Severely dampened/near occlusion            : Mild dampening             head right: Normal             head left: Normal  Impression: IMPRESSION:   1. RIGHT:       A. No subclavian venous stenosis at rest. No cessation of  subclavian vein blood flow with maneuvers.        B. Mildly elevated velocities and waveform dampening affecting  the internal jugular vein with maneuvers as above.        C. Mild dampening of arterial waveforms with maneuvers as above.    2. LEFT:       A. No subclavian venous stenosis at rest. No cessation of  subclavian vein blood flow with maneuvers.       B. Cessation of blood flow in the left internal jugular vein with  head rotation to left as noted above.       C. Marked dampening of PPG waveform with 180 degrees abduction  position.    ABDOMINAL COMPLETE WITH DOPPLER ULTRASOUND 12/24/2024    FINDINGS:  AORTA, supraceliac: 140/89 cm/s, triphasic  AORTA, infrarenal proximal: 161 cm/s, triphasic  AORTA, infrarenal mid: 156 cm/s, triphasic  AORTA, infrarenal distal: 128 cm/s, triphasic    CELIAC ARTERY, origin (holding inspiration): 116/32 cm/s, monophasic  CELIAC ARTERY, origin (holding expiration): Not visualized  CELIAC ARTERY, proximal/mid: 264/87 cm/s, monophasic  CELIAC ARTERY, distal: 229/100 cm/s, monophasic    SUPERIOR MESENTERIC ARTERY, origin: 111/35 cm/s, monophasic  SUPERIOR MESENTERIC ARTERY, proximal: 228/29 cm/s, triphasic  SUPERIOR MESENTERIC ARTERY, mid: 216/29 cm/s, triphasic  SUPERIOR MESENTERIC ARTERY, distal: 145 cm/s, triphasic    INFERIOR MESENTERIC ARTERY,  origin: Obscured  INFERIOR MESENTERIC ARTERY, proximal: Obscured    HEPATIC ARTERY, anne-marie hepatis: 63/19 cm/s, monophasic  SPLENIC ARTERY, proximal: 192/70 cm/s, monophasic  Impression: IMPRESSION:   1.  Peak systolic velocities in the celiac artery at the proximal/mid  segment measure 264 cm/s, suggesting greater than 50% stenosis based  on duplex ultrasound criteria. See guidelines below.  Celiac artery  could not be visualized on expiration.  2.  Superior mesenteric artery demonstrates triphasic flow pattern  throughout, with no evidence of significant stenosis.  3.  Limited evaluation of the inferior mesenteric artery origin and  proximal segment due to obscuration.    Guidelines:   Peak Systolic Velocity Criteria:       Celiac Artery > 50% stenosis: > 240 cm/s       Celiac Artery > 70% stenosis: > 320 cm/s         Superior Mesenteric Artery > 50% stenosis: > 295 cm/s       Superior Mesenteric Artery > 70% stenosis: > 400 cm/s    Makenna et al. Mesenteric/celiac duplex ultrasound interpretation  criteria revisited. Journal of Vascular Surgery. 2012; 55:428-36.    I have personally reviewed the examination and initial interpretation  and I agree with the findings.    DEANNA MARISCAL MD         SYSTEM ID:  [id number]      The longitudinal plan of care for the diagnosis(es)/condition(s) as documented were addressed during this visit. Due to the added complexity in care, I will continue to support Екатерина in the subsequent management and with ongoing continuity of care.    38-minutes were spent in evaluation, counseling, and documentation on the date of service.      Again, thank you for allowing me to participate in the care of your patient.      Sincerely,    Lucila LOPEZ Cha, MD

## 2025-01-22 NOTE — NURSING NOTE
Chief Complaint   Patient presents with    RECHECK         /78 (BP Location: Left arm, Patient Position: Sitting, Cuff Size: Adult Regular)   Pulse 90   Resp 14   SpO2 94%       Gloria Garcia

## 2025-01-23 ENCOUNTER — MYC MEDICAL ADVICE (OUTPATIENT)
Dept: INTERNAL MEDICINE | Facility: CLINIC | Age: 35
End: 2025-01-23
Payer: COMMERCIAL

## 2025-01-23 DIAGNOSIS — Q79.62 HYPERMOBILE EHLERS-DANLOS SYNDROME: Primary | ICD-10-CM

## 2025-01-24 ENCOUNTER — TELEPHONE (OUTPATIENT)
Dept: NEUROLOGY | Facility: CLINIC | Age: 35
End: 2025-01-24

## 2025-01-27 ENCOUNTER — THERAPY VISIT (OUTPATIENT)
Dept: OCCUPATIONAL THERAPY | Facility: CLINIC | Age: 35
End: 2025-01-27
Attending: NURSE PRACTITIONER
Payer: COMMERCIAL

## 2025-01-27 DIAGNOSIS — Q79.62 HYPERMOBILE EHLERS-DANLOS SYNDROME: ICD-10-CM

## 2025-01-27 DIAGNOSIS — M25.532 PAIN IN BOTH WRISTS: Primary | ICD-10-CM

## 2025-01-27 DIAGNOSIS — M25.531 PAIN IN BOTH WRISTS: Primary | ICD-10-CM

## 2025-01-27 PROCEDURE — 97110 THERAPEUTIC EXERCISES: CPT | Mod: GO | Performed by: OCCUPATIONAL THERAPIST

## 2025-01-27 PROCEDURE — 97760 ORTHOTIC MGMT&TRAING 1ST ENC: CPT | Mod: GO | Performed by: OCCUPATIONAL THERAPIST

## 2025-01-27 PROCEDURE — 97166 OT EVAL MOD COMPLEX 45 MIN: CPT | Mod: GO | Performed by: OCCUPATIONAL THERAPIST

## 2025-01-27 NOTE — PROGRESS NOTES
"OCCUPATIONAL THERAPY EVALUATION  Type of Visit: Evaluation    Diagnosis: Right wrist pain, hypermobility     Precautions: Migraines, EDS  Patient is Right hand dominant    Subjective        Presenting condition or subjective complaint:    As above.  Pain tends to be central dorsal right wrist    Date of onset: 01/23/25 (Orders)    Relevant medical history:   has migraines with visual issues. EDS. H/o significant Dequervain's. (which took a long time to recover - did improve with activity mods, splints)  Dates & types of surgery:  None of the hands    Prior diagnostic imaging/testing results:     Please refer to electronic medical record.  Prior therapy history for the same diagnosis, illness or injury:    Working with OT in regards to visual strategies.     Prior Level of Function  Transfer  Ambulation: w/c and cane(turns into a chair) as needed. When in line for example.   ADL: Independent  IADL: Independent, with some modifications    Living Environment:   No overt concerns about accessibility and mobility in the home.  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 Wanting to doctorate in womens health (has bachelor's in environmental health and sculpture).  Hobbies/Interests: nature (herbalist), art (has been too physically difficult), likes to learn (audiobooks), friends and family; currently learning ASL. Has a dog (who does not pull). Nieces/nephews in the area.    Patient goals for therapy:  Using phone, manual w/c correctly. Wondering about splints at night.       Objective   Pt comments on symptoms and function in activities:   \"I got a new phone. Its bigger, I feel like I'm straining and it's heavier. \"  Got a wheelchair ~ 2 months ago (uses for bad days and long distances once every 1-2 weeks).  Chopping is still difficult.  \"I have been waking up in the mornings and I have a lot of pain here (dorsal " "R wrist). It's very different than the DeQuervain's tenosynovitis. \"    OBJECTIVE:    Pain Level (Scale 0-10)   1/27/2025   At Rest Mild   At worst Mild to moderate     Pain Description  Date 1/27/2025   Location R dorsal central wrist   Pain Quality Aching and Sharp   Frequency intermittent     Pain is worst  daytime   Exacerbated by Weightbearing on extended wrist bothers it in particular, phone use can make it worse   Relieved by rest     Beighton Hypermobility Scale:  Did not complete; see chart.  The bigger joints tend to be the bothersome hypermobile joints for the patient.    Edema:   Pt comments on edema: hands do not swell (used to)    Sensation   WNL throughout all nerve distributions; per patient report    RANGE OF MOTION:    (If pain present, noted as mild +, moderate ++, severe +++)    Shoulders:   Pt comments: Does need to be careful about strain to the proximal upper quarter    Elbows  Clinical observations: mild HE    Wrists  Pt comments: crack a lot. Can distract the carpus distally, especially the R.  Inspection: minimal ulnar sag, no prominent ulnar head, no gross deformities.  DRUJ stability testing: mild instability but not tender.  AROM  Hypermobility level   per observation Pain / comments   Extension  none     Flexion mild     RD none     UD mild Can feel it a bit on the R at radial wrist       Central Dorsal Zone:  Provocative Tests:  Pain Report:  - none    + mild    ++ moderate    +++ severe     Side L R   Finger Extension Test (SL--in wrist) flex, resist long ext) - -   Clarke Test (SL)  UD to RD - - possible hypermobility   Linscheid Test (CMC joints--stabilize distal MC, mob CMC)     Scapholunate Ballottement   Mild laxity compared to the L        Ulnar Dorsal Zone: Pain Report:  - none    + mild    ++ moderate    +++ severe   Side L R   DRUJ Ballottement test (DRUJ or TFCC): (f/a neutral, stabilize radius/carpus, Volar force to ulna, let it return to normal)    -    DRUJ Ballottement " test (DRUJ or TFCC): (f/a neutral, stabilize radius/carpus, Dorsal force to ulna, let it return to normal)     -   Lunotriquetral Ballottement Test     -  -   Midcarpal shift test: (stabilize f/a in pronation, wrist starts in 15  UD-volar pressure to capitate w/ axial load and more UD) +=painful clunk =to sx  less of a clunk than the R Clunk, no pain    Dorsovolar capitate displacement test: (stabilize lunate, dorsovolar displace capitate) check for amplitude of movement a of capitate volar vs dorsal            Thumbs  Pt comments: Able to touch thumbs to forearms.  Did not fully assess.  No pain with light gentle APL isometrics this date.    Fingers / Hands  Pt comments: Uses a squishy ball to keep up strength a bit  AROM and gentle PROM MPj PIPj DIPj    hyperextension   mild  mild  mild       Strength:  Testing deferred this date  Palpation:    Location R   Dorsal DRUJ -   Dorsal central wrist near the SL interval +   Distal dorsal radius -       Pain Report:    + mild    ++ moderate    +++ severe    Assessment & Plan   CLINICAL IMPRESSIONS  Medical Diagnosis: Right wrist pain, hypermobility    Treatment Diagnosis: Right wrist pain, hypermobility    Impression/Assessment: Pt is a 34 year old female presenting to Occupational Therapy due to right wrist pain, which appears to have a component of possible scapholunate ligament irritation.  The following significant findings have been identified: Impaired activity tolerance and Pain.  These identified deficits interfere with their ability to perform self care tasks, household chores, community mobility, and meal planning and preparation as compared to previous level of function.     Clinical Decision Making (Complexity):  Assessment of Occupational Performance: 3-5 Performance Deficits  Occupational Performance Limitations: hygiene and grooming, driving and community mobility, home establishment and management, meal preparation and cleanup, sleep, and leisure  activities  Clinical Decision Making (Complexity): Moderate complexity    PLAN OF CARE  Treatment Interventions:  Modalities:  US and Paraffin  Therapeutic Exercise:  AROM, Isotonics, Isometrics, and Stabilization  Neuromuscular re-education:  Proprioceptive Training, Posture, and Kinesiotaping  Orthotic Fabrication:  Static, Hand based, and Forearm based  Self Care:  Self Care Tasks, Ergonomic Considerations, and Work Tasks    Long Term Goals   OT Goal 1  Goal Identifier: Self-management of symptoms  Goal Description: Pt will be IND in a comprehensive program to manage pain of the bilateral wrists including 1-2 orthoses, incorporating 2 new joint protection techniques and performing a pain free exercise program, in order to optimize self management of pain and joint instability during ADLs.  Target Date: 04/07/25      Frequency of Treatment: 5 visits  Duration of Treatment: In 10 weeks     Recommended Referrals to Other Professionals:  N/A  Education Assessment: Learner/Method: Patient;No Barriers to Learning;Listening;Reading;Demonstration;Pictures/Video     Risks and benefits of evaluation/treatment have been explained.   Patient/Family/caregiver agrees with Plan of Care.     Evaluation Time:    OT Eval, Moderate Complexity Minutes (75084): 20       Signing Clinician: Carla Guzman OT        [facility]                                                                                   OUTPATIENT OCCUPATIONAL THERAPY      PLAN OF TREATMENT FOR OUTPATIENT REHABILITATION   Patient's Last Name, First Name, Екатерина Reina YOB: 1990   Provider's Name   [facility]   Medical Record No.  [id number]     Onset Date: 01/23/25 (Orders) Start of Care Date: 01/27/25     Medical Diagnosis:  Right wrist pain, hypermobility      OT Treatment Diagnosis:  Right wrist pain, hypermobility Plan of Treatment  Frequency/Duration:5 visits/In 10  weeks    Certification date from 01/27/25   To 04/07/25        See note for plan of treatment details and functional goals     Carla Guzman, OT                         I CERTIFY THE NEED FOR THESE SERVICES FURNISHED UNDER        THIS PLAN OF TREATMENT AND WHILE UNDER MY CARE     (Physician attestation of this document indicates review and certification of the therapy plan).              Referring Provider:  Geri Loyola    Initial Assessment  See Epic Evaluation- 01/27/25

## 2025-01-28 DIAGNOSIS — G93.2 INTRACRANIAL HYPERTENSION: Primary | ICD-10-CM

## 2025-01-28 RX ORDER — ACETAZOLAMIDE 250 MG/1
TABLET ORAL
Qty: 60 TABLET | Refills: 3 | Status: SHIPPED | OUTPATIENT
Start: 2025-01-28

## 2025-01-28 NOTE — TELEPHONE ENCOUNTER
[facility] Prior Authorization Team   Phone: [phone]    PRIOR AUTHORIZATION DENIED    Medication: METHAZOLAMIDE 25 MG PO TABS  Insurance Company: [employer] [place] - Phone [phone] Fax [phone]  Denial Date: 1/27/2025  Denial Reason(s): MUST HAVE AN FDA APPROVED CONDITION      Appeal Information: IF THE PROVIDER WOULD LIKE TO APPEAL THIS DECISION PLEASE PROVIDE THE PA TEAM WITH A LETTER OF MEDICAL NECESSITY      Patient Notified: NO

## 2025-01-29 ENCOUNTER — OFFICE VISIT (OUTPATIENT)
Dept: SLEEP MEDICINE | Facility: CLINIC | Age: 35
End: 2025-01-29
Attending: NURSE PRACTITIONER
Payer: COMMERCIAL

## 2025-01-29 VITALS
DIASTOLIC BLOOD PRESSURE: 75 MMHG | OXYGEN SATURATION: 96 % | BODY MASS INDEX: 29.82 KG/M2 | WEIGHT: 179 LBS | HEIGHT: 65 IN | HEART RATE: 97 BPM | SYSTOLIC BLOOD PRESSURE: 106 MMHG

## 2025-01-29 DIAGNOSIS — R06.83 SNORING: ICD-10-CM

## 2025-01-29 DIAGNOSIS — G47.10 HYPERSOMNIA, UNSPECIFIED: Primary | ICD-10-CM

## 2025-01-29 DIAGNOSIS — G47.8 OTHER SLEEP DISORDERS: ICD-10-CM

## 2025-01-29 PROCEDURE — 99204 OFFICE O/P NEW MOD 45 MIN: CPT | Performed by: INTERNAL MEDICINE

## 2025-01-29 NOTE — NURSING NOTE
"Chief Complaint   Patient presents with    Sleep Problem     Recently started snoring lately, Sleep has been a hard thing for a long time, Very vivid dreams. Throat feels restricted-even during the day. At least 2 days a week I will wake at 5 to snack and clean, then go back to sleep.       Initial /75   Pulse 97   Ht 1.638 m (5' 4.5\")   Wt 81.2 kg (179 lb)   SpO2 96%   BMI 30.25 kg/m   Estimated body mass index is 30.25 kg/m  as calculated from the following:    Height as of this encounter: 1.638 m (5' 4.5\").    Weight as of this encounter: 81.2 kg (179 lb).    Medication Reconciliation: complete  ESS   Neck circumference:  35  centimeters.  Deborah Ayala MA   "

## 2025-01-29 NOTE — PROGRESS NOTES
Sleep Consultation:    Date on this visit: 1/29/2025    Екатерина Lucero  is referred by Geri Loyola for a sleep consultation.     Primary Physician: Geri Loyola     Екатерина Lucero reports nightly poor quality of sleep for many years.     Her medical history is significant for chronic migraine, Harvey Danlos syndrome, TMD, ADHD and anxiety disorder.     She has not been able to work for the last few years due to her medical morbidities.    Over the years, patient has developed multiple coping strategies to address her sensory sensitivities.  She has a elaborate bedtime routine and tries to maintain regular sleep-wake schedule.  In the past she had sleep evaluations and sleep studies.  Most recent test was a home sleep test on 9/25/2023 at a weight of 155 pounds.  This was negative for sleep apnea with an JANIE of 0.2/h and HARRY of 0.3/h.  Lowest oxygen saturation was 91%.  I was also able to review PSG report from 4/26/2015 that was performed at [facility].  Apnea-hypopnea index was reported at 0/h.  This captured a total sleep time of 379 minutes, REM latency of 259 minutes.  Although majority of sleep was in the nonsupine position, supine sleep was captured.    Her weight is higher, and she has now developed symptoms of snoring and sleep disturbance that was not present before.    Екатерина does snore frequently. Patient's bed partner does complain of snoring and snorting. She does not have witnessed apneas.They occasionally sleep separately.  Patient sleeps on her side. She has frequent morning headaches, denies no morning dry mouth and restless legs.     Екатерина goes to bed at 11 PM, and falls asleep in 5 -10 minutes.  She wakes up at 8:30 AM.  Екатерина denies difficulty falling asleep.  She wakes up 2 times a night for 20 minutes before falling back to sleep.  Екатерина wakes up to uncertain reasons and pain.      Екатерина denies any sleep walking, sleep talking, dream enactment, sleep paralysis,  cataplexy, and hypnogogic/hypnopompic hallucinations.    Екатерина denies difficulty breathing through her nose.      Patient's [place] Sleepiness score 8/24.      Екатерина naps 1-2 times per week for 20-30 minutes, feels refreshed after naps. She takes some inadvertant naps.  She denies closing eyes, dozing, and falling asleep while driving. Patient was counseled on the importance of driving while alert, to pull over if drowsy, or nap before getting into the vehicle if sleepy.      She uses 2- cups/day of coffee. Last caffeine intake is usually before noon.    Problem List:  Patient Active Problem List    Diagnosis Date Noted    Pain in both wrists 01/27/2025     Priority: Medium    Visual disturbance 06/18/2024     Priority: Medium    Temporomandibular joint disorder 10/06/2023     Priority: Medium    EDS (Harvey-Danlos syndrome) 10/06/2023     Priority: Medium    PPPD 10/06/2023     Priority: Medium    Bulimia nervosa, in full remission, mild 05/04/2022     Priority: Medium     Formatting of this note might be different from the original.  onset 14-y-o, sx remit in college      Chronic pain disorder 05/04/2022     Priority: Medium     Formatting of this note might be different from the original.  possible autoimmune disorder, ongoing medical assessement      Generalized anxiety disorder 05/04/2022     Priority: Medium    Major depressive disorder, recurrent episode, moderate (H) 05/04/2022     Priority: Medium     Formatting of this note might be different from the original.  sx onset early adolescence, recurring episodes      Specific learning disorder with reading impairment 05/04/2022     Priority: Medium     Formatting of this note might be different from the original.  hx of academic accomodations in school      Psychological factors affecting medical condition 05/04/2022     Priority: Medium     Formatting of this note might be different from the original.  history of interaction between hyperglycemia and eating  disorder      Trichotillomania 05/04/2022     Priority: Medium     Formatting of this note might be different from the original.  began as hair twirling in elementary school, last 3-4 years noticeable bald spots      Elevated dehydroepiandrosterone (DHEA) level 01/06/2022     Priority: Medium    Sjogren's syndrome without extraglandular involvement 01/06/2022     Priority: Medium    SS-A antibody positive 08/26/2020     Priority: Medium    Chronic migraine without aura without status migrainosus, not intractable 06/12/2020     Priority: Medium    PCOS (polycystic ovarian syndrome) 11/25/2019     Priority: Medium    Depression 11/21/2018     Priority: Medium    Other fatigue 11/21/2018     Priority: Medium    History of Lyme disease 06/11/2018     Priority: Medium    Pain of finger of right hand 11/29/2016     Priority: Medium    Myofascial pain 11/02/2016     Priority: Medium    Myositis 08/09/2016     Priority: Medium    Left knee pain 01/24/2016     Priority: Medium     Formatting of this note might be different from the original.  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  Stable with homeopathic remedy since preteen  Formatting of this note might be different from the original.  Overview:   Stable with homeopathic remedy since preteen      Gluten intolerance 01/22/2016     Priority: Medium    Patellofemoral pain syndrome 10/04/2012     Priority: Medium    Senile lentigo 08/19/2012     Priority: Medium    Family history of malignant melanoma 08/19/2012     Priority: Medium    CARDIOVASCULAR SCREENING; LDL GOAL LESS THAN 160 10/31/2010     Priority: Medium        Past Medical/Surgical History:  Past Medical History:   Diagnosis Date    ADD (attention deficit  "disorder)     Not on medication    Chronic daily headache     Depression with anxiety     Fractures     R foot, L hand,R hand -- gymnastics    Gluten intolerance     History of eating disorder     bulimia, in remission    Hypermobile joints     PCOS (polycystic ovarian syndrome)     Sjogren's syndrome without extraglandular involvement      Physical Examination:  Vitals: /75   Pulse 97   Ht 1.638 m (5' 4.5\")   Wt 81.2 kg (179 lb)   SpO2 96%   BMI 30.25 kg/m    BMI= Body mass index is 30.25 kg/m .  GENERAL APPEARANCE: healthy, alert, and no distress  HENT: oropharynx crowded and tongue base enlarged  NEURO: mentation intact and speech normal  PSYCH: mentation appears normal and affect normal/bright  Mallampati Class: IV.  Tonsillar Stage: 1  hidden by pillars.    Impression/Plan:    Possible obstructive sleep apnea    Patient is a 34 years old female, BMI of 30, medical comorbidity of chronic migraine, Harvey Danlos syndrome, TMD, ADHD and anxiety disorder, who presents with new onset of snoring and sleep disturbance that was not there before.  Recent weight gain of about 25 pounds seems to have exacerbated symptoms.  Although she had previous sleep testing in 2016 in 2023 that was negative for sleep apnea, symptoms are new and raises concerns about possible sleep disordered breathing.  Overall sleep quality is poor and she experiences significant daytime fatigue.  Over the years, she has developed coping skills to manage her sensory issues and is made changes based on previous sleep considerations.  She maintains good sleep hygiene habits and sleep schedules.  At this time, I recommend performing a PSG to assess if there is sleep disordered breathing or other significant sleep fragmenting conditions.    Plan:     PSG for assessment of sleep apnea    She will follow up with me after her sleep study has been competed to review the results and discuss plan of care.       Polysomnography " reviewed.  Obstructive sleep apnea reviewed.  Complications of untreated sleep apnea were reviewed.    I spent a total of 45 minutes for this appointment on this date of service which include time spent before, during and after the visit for chart review, patient care, counseling and coordination of care.      Electronically signed by Dr. Ken Aguilera, Diplomate, Sleep Medicine, ABPN       CC: Geri Loyola

## 2025-02-07 ENCOUNTER — VIRTUAL VISIT (OUTPATIENT)
Dept: NEUROLOGY | Facility: CLINIC | Age: 35
End: 2025-02-07
Payer: COMMERCIAL

## 2025-02-07 DIAGNOSIS — G43.709 CHRONIC MIGRAINE WITHOUT AURA WITHOUT STATUS MIGRAINOSUS, NOT INTRACTABLE: ICD-10-CM

## 2025-02-07 DIAGNOSIS — R60.0 LOCAL EDEMA: ICD-10-CM

## 2025-02-07 PROCEDURE — 98007 SYNCH AUDIO-VIDEO EST HI 40: CPT | Performed by: PSYCHIATRY & NEUROLOGY

## 2025-02-07 RX ORDER — MULTIVITAMIN WITH IRON
500 TABLET ORAL DAILY
COMMUNITY
Start: 2025-02-07

## 2025-02-07 RX ORDER — VENLAFAXINE HYDROCHLORIDE 37.5 MG/1
37.5 TABLET, EXTENDED RELEASE ORAL DAILY
Qty: 90 TABLET | Refills: 11 | Status: SHIPPED | OUTPATIENT
Start: 2025-02-07

## 2025-02-07 NOTE — PROGRESS NOTES
Екатерина is a 34 year old who is being evaluated via a billable video visit.    How would you like to obtain your AVS? MyChart  If the video visit is dropped, the invitation should be resent by: Send to e-mail at: [email or web address]  Will anyone else be joining your video visit? No      Video-Visit Details    Type of service:  Video Visit   Video End Time:3:42 PM  Originating Location (pt. Location): Other In car  In [place]    Distant Location (provider location):  On-site  Platform used for Video Visit: [facility]    Neurology Progress Note    [facility]    Екатерина Joyaupp MRN# [id number]   Age: 34 year old YOB: 1990     PCP: Geri Loyola            Assessment and Plan:     (G43.709) Chronic migraine without aura without status migrainosus, not intractable  Comment: At this time go back to lower dose of venlafaxine, continue with Nurtec  Plan:   DDecrease venlafaxine down to 37.5 mg daily  Stop Zavzpret and restart Nurtec as primary rescue medicine.     (R60.0) Local edema  Comment: Sent refills for Lasix as pharmacy could not refill  Plan: furosemide (LASIX) 20 MG tablet          Discussed options, sounds like she will pursue chemodenervation tx with Dr Diaz first and then if no help pursue spinal myelogram testing at [place].   40 minutes spent on the day managing the patient including chart review and video time.     Shazia Amaya MD           History of Present Illness:   CC: Recheck    Last visit with me August 2, 2024.  Was scheduled to be seen in person and was switched to virtual today.    Екатерина has met with Dr Diaz. She started Lasix 20 mg TID helped her symptoms but she ran out yesterday. She thought it helped at first but then it stopped due to possible side effects. She had messaged about Lasix causing side effects but had forgotten she was told to decrease down to 10 mg TID PRN.Not clear she ever reduced the dose. Side effects were fatigue and foggy head feeling, might be related to  "venlafaxine increase.     She went down to [place]. She saw Dr Flaherty who still suspects a CSF leak from a fistula and he is recommending myelogram to assess. Some concern over dye side effects. He did not wish to comment on Dr Diaz's recommendations. She is a little overwhelmed  with deciding next steps and whether to pursue testing/treatng both or start with one then do the other if no improvements.     Update on medicines:   Zavzpret was not tolerated-Tasted horrible for hours, irritation of sinuses and on help for headache.   She wants Nurtec as her primary rescue it has worked the best of all 3.   Ubrelvy is no help  Previously tried and failed numerous triptans including: rizatriptan, sumatriptan and naratriptan.     Venlafaxine ER 75 mg daily is being used. (Might be causing weight gain and brain fog) wondering if she can go back to 37.5 mg dosing  Previous preventive trials include:  Emgality-made legs feel veyr painful  Qulipta ineffective  Gabapentin brief trial not tolerated  Cyclobenzaprine 5 mg at night initially helped for first few days then shot back up. Only using PRN  PT on neck (neck is tense because it is overworking from EDS does not have \"true cervical cranial instability\"  Topiramate not tried history of eating disorder best to avoid. Also has diagnosis of ADHD might make that worse  Low dose Naltrexone started for PCOS and autoimmune stuff. No effect on headache  Neurivio for prevention and acute. Using helps a little  Magnesium oxide no help      Has had metabolic testing in past that showed :Tests show patient has normal CYP2D6 metabolism and ultrarapid metabolism at [id number]        Physical Exam:     Deferred         Pertinent History/Data for appointment:     1/22/2025 visit with Dr. Diaz note reviewed she diagnosed the patient with cervical dystonia, thoracic outlet, intracranial hypertension.  She has concern the patient might have nutcracker phenomenon of the renal pain and May Thurner " syndrome.    She started Lasix 20 mg 3 times a day.  She is asked for CT angiography and CT venography of the abdomen as well as CT venography of the head with turning.  She is considering neurotoxin injections and has started on methazolamide titration.    Methazolamide was not covered so the patient was prescribed acetazolamide.    1/21/25 Dr Reynold Conley note.     1/28/2025 Rheumatology note Dr. French reviewed.  Conclusions were that her history was suggestive of fibromyalgia.  Possibly Sjogren's disease in the background but unclear.  The plan was to make no medicine changes, continue water exercises and continue medications with psych.  Recommendation was to follow SPEP/IEP once a year with rheumatology appointments once a year.

## 2025-02-07 NOTE — LETTER
2/7/2025       RE: Екатерина Lucero  [address]  [place] [address]     Dear Colleague,    Thank you for referring your patient, Екатерина Lucero, to the [facility] [place] [facility]. Please see a copy of my visit note below.    Екатерина is a 34 year old who is being evaluated via a billable video visit.    How would you like to obtain your AVS? MyChart  If the video visit is dropped, the invitation should be resent by: Send to e-mail at: [email or web address]  Will anyone else be joining your video visit? No      Video-Visit Details    Type of service:  Video Visit   Video End Time:3:42 PM  Originating Location (pt. Location): Other In car  In [place]    Distant Location (provider location):  On-site  Platform used for Video Visit: [facility]    Neurology Progress Note     Physicians    Екатерина Lucero MRN# [id number]   Age: 34 year old YOB: 1990     PCP: Geri Loyola            Assessment and Plan:     (G43.709) Chronic migraine without aura without status migrainosus, not intractable  Comment: At this time go back to lower dose of venlafaxine, continue with Nurtec  Plan:   DDecrease venlafaxine down to 37.5 mg daily  Stop Zavzpret and restart Nurtec as primary rescue medicine.     (R60.0) Local edema  Comment: Sent refills for Lasix as pharmacy could not refill  Plan: furosemide (LASIX) 20 MG tablet          Discussed options, sounds like she will pursue chemodenervation tx with Dr Diaz first and then if no help pursue spinal myelogram testing at [place].   40 minutes spent on the day managing the patient including chart review and video time.     Shazia Amaya MD           History of Present Illness:   CC: Recheck    Last visit with me August 2, 2024.  Was scheduled to be seen in person and was switched to virtual today.    Екатерина has met with Dr Diaz. She started Lasix 20 mg TID helped her symptoms but she ran out yesterday.  "She thought it helped at first but then it stopped due to possible side effects. She had messaged about Lasix causing side effects but had forgotten she was told to decrease down to 10 mg TID PRN.Not clear she ever reduced the dose. Side effects were fatigue and foggy head feeling, might be related to venlafaxine increase.     She went down to [place]. She saw Dr Flaherty who still suspects a CSF leak from a fistula and he is recommending myelogram to assess. Some concern over dye side effects. He did not wish to comment on Dr Diaz's recommendations. She is a little overwhelmed  with deciding next steps and whether to pursue testing/treatng both or start with one then do the other if no improvements.     Update on medicines:   Zavzpret was not tolerated-Tasted horrible for hours, irritation of sinuses and on help for headache.   She wants Nurtec as her primary rescue it has worked the best of all 3.   Ubrelvy is no help  Previously tried and failed numerous triptans including: rizatriptan, sumatriptan and naratriptan.     Venlafaxine ER 75 mg daily is being used. (Might be causing weight gain and brain fog) wondering if she can go back to 37.5 mg dosing  Previous preventive trials include:  Emgality-made legs feel veyr painful  Qulipta ineffective  Gabapentin brief trial not tolerated  Cyclobenzaprine 5 mg at night initially helped for first few days then shot back up. Only using PRN  PT on neck (neck is tense because it is overworking from EDS does not have \"true cervical cranial instability\"  Topiramate not tried history of eating disorder best to avoid. Also has diagnosis of ADHD might make that worse  Low dose Naltrexone started for PCOS and autoimmune stuff. No effect on headache  Neurivio for prevention and acute. Using helps a little  Magnesium oxide no help      Has had metabolic testing in past that showed :Tests show patient has normal CYP2D6 metabolism and ultrarapid metabolism at [id number]        Physical Exam: "     Deferred         Pertinent History/Data for appointment:     1/22/2025 visit with Dr. Diaz note reviewed she diagnosed the patient with cervical dystonia, thoracic outlet, intracranial hypertension.  She has concern the patient might have nutcracker phenomenon of the renal pain and May Thurner syndrome.    She started Lasix 20 mg 3 times a day.  She is asked for CT angiography and CT venography of the abdomen as well as CT venography of the head with turning.  She is considering neurotoxin injections and has started on methazolamide titration.    Methazolamide was not covered so the patient was prescribed acetazolamide.    1/21/25 Dr Reynold Conley note.     1/28/2025 Rheumatology note Dr. French reviewed.  Conclusions were that her history was suggestive of fibromyalgia.  Possibly Sjogren's disease in the background but unclear.  The plan was to make no medicine changes, continue water exercises and continue medications with psych.  Recommendation was to follow SPEP/IEP once a year with rheumatology appointments once a year.          Again, thank you for allowing me to participate in the care of your patient.      Sincerely,    Shazia Amaya MD

## 2025-02-08 RX ORDER — FUROSEMIDE 20 MG/1
20 TABLET ORAL EVERY 8 HOURS PRN
Qty: 90 TABLET | Refills: 3 | Status: SHIPPED | OUTPATIENT
Start: 2025-02-08

## 2025-02-12 ENCOUNTER — THERAPY VISIT (OUTPATIENT)
Dept: OCCUPATIONAL THERAPY | Facility: CLINIC | Age: 35
End: 2025-02-12
Payer: COMMERCIAL

## 2025-02-12 DIAGNOSIS — M25.531 PAIN IN BOTH WRISTS: Primary | ICD-10-CM

## 2025-02-12 DIAGNOSIS — M25.532 PAIN IN BOTH WRISTS: Primary | ICD-10-CM

## 2025-02-12 PROCEDURE — 97535 SELF CARE MNGMENT TRAINING: CPT | Mod: GO | Performed by: OCCUPATIONAL THERAPIST

## 2025-02-13 ENCOUNTER — TELEPHONE (OUTPATIENT)
Dept: NEUROLOGY | Facility: CLINIC | Age: 35
End: 2025-02-13
Payer: COMMERCIAL

## 2025-02-13 NOTE — TELEPHONE ENCOUNTER
Patient confirmed scheduled appointment:  Date: 08/01/2025  Time: 4:45PM  Visit type: RETURN HEADACHE  Provider: KRISTEN  Location: [facility]  Testing/imaging: N/A  Additional notes: N/A

## 2025-02-21 ENCOUNTER — ANCILLARY PROCEDURE (OUTPATIENT)
Dept: CT IMAGING | Facility: CLINIC | Age: 35
End: 2025-02-21
Attending: PSYCHIATRY & NEUROLOGY
Payer: COMMERCIAL

## 2025-02-21 PROCEDURE — 70498 CT ANGIOGRAPHY NECK: CPT | Performed by: RADIOLOGY

## 2025-02-21 PROCEDURE — 70496 CT ANGIOGRAPHY HEAD: CPT | Performed by: RADIOLOGY

## 2025-02-21 RX ORDER — IOPAMIDOL 755 MG/ML
70 INJECTION, SOLUTION INTRAVASCULAR ONCE
Status: DISCONTINUED | OUTPATIENT
Start: 2025-02-21 | End: 2025-02-21

## 2025-02-21 RX ORDER — IODIXANOL 320 MG/ML
75 INJECTION, SOLUTION INTRAVASCULAR ONCE
Status: COMPLETED | OUTPATIENT
Start: 2025-02-21 | End: 2025-02-21

## 2025-02-21 RX ORDER — IODIXANOL 320 MG/ML
70 INJECTION, SOLUTION INTRAVASCULAR ONCE
Status: DISCONTINUED | OUTPATIENT
Start: 2025-02-21 | End: 2025-02-21

## 2025-02-21 RX ADMIN — IODIXANOL 75 ML: 320 INJECTION, SOLUTION INTRAVASCULAR at 10:51

## 2025-02-21 NOTE — DISCHARGE INSTRUCTIONS

## 2025-03-05 ENCOUNTER — OFFICE VISIT (OUTPATIENT)
Dept: INTERNAL MEDICINE | Facility: CLINIC | Age: 35
End: 2025-03-05
Payer: COMMERCIAL

## 2025-03-05 ENCOUNTER — TRANSFERRED RECORDS (OUTPATIENT)
Dept: HEALTH INFORMATION MANAGEMENT | Facility: CLINIC | Age: 35
End: 2025-03-05

## 2025-03-05 VITALS
DIASTOLIC BLOOD PRESSURE: 82 MMHG | OXYGEN SATURATION: 96 % | HEART RATE: 98 BPM | SYSTOLIC BLOOD PRESSURE: 120 MMHG | HEIGHT: 65 IN | TEMPERATURE: 98 F | RESPIRATION RATE: 18 BRPM | BODY MASS INDEX: 31.07 KG/M2 | WEIGHT: 186.5 LBS

## 2025-03-05 DIAGNOSIS — Q79.60 EDS (EHLERS-DANLOS SYNDROME): ICD-10-CM

## 2025-03-05 DIAGNOSIS — R14.0 ABDOMINAL BLOATING: ICD-10-CM

## 2025-03-05 DIAGNOSIS — I87.1 COMPRESSION OF VEIN: ICD-10-CM

## 2025-03-05 DIAGNOSIS — F41.1 GENERALIZED ANXIETY DISORDER: ICD-10-CM

## 2025-03-05 DIAGNOSIS — G43.709 CHRONIC MIGRAINE WITHOUT AURA WITHOUT STATUS MIGRAINOSUS, NOT INTRACTABLE: Primary | ICD-10-CM

## 2025-03-05 DIAGNOSIS — R63.5 WEIGHT GAIN: ICD-10-CM

## 2025-03-05 DIAGNOSIS — F33.1 MAJOR DEPRESSIVE DISORDER, RECURRENT EPISODE, MODERATE (H): ICD-10-CM

## 2025-03-05 PROCEDURE — 99215 OFFICE O/P EST HI 40 MIN: CPT | Performed by: NURSE PRACTITIONER

## 2025-03-05 ASSESSMENT — PATIENT HEALTH QUESTIONNAIRE - PHQ9
SUM OF ALL RESPONSES TO PHQ QUESTIONS 1-9: 8
10. IF YOU CHECKED OFF ANY PROBLEMS, HOW DIFFICULT HAVE THESE PROBLEMS MADE IT FOR YOU TO DO YOUR WORK, TAKE CARE OF THINGS AT HOME, OR GET ALONG WITH OTHER PEOPLE: SOMEWHAT DIFFICULT
SUM OF ALL RESPONSES TO PHQ QUESTIONS 1-9: 8

## 2025-03-05 NOTE — PROGRESS NOTES
"  {PROVIDER CHARTING PREFERENCE:717286}    Subjective   Екатерина is a 34 year old, presenting for the following health issues:  Follow Up (Follow up on weight management.)      3/5/2025     4:27 PM   Additional Questions   Roomed by KTR     History of Present Illness       Reason for visit:  FOLLOW UP ON WEIGHT ISSUE   She is taking medications regularly.        ***  Neuro things going on, working with Dr. Amaya and Dr. Diaz, imaging.    Dr. Nice (EDS at [facility]) recommended low-dose naltrexone, but pharmacy couldn't fill it.    Struggling with weight.    Concerned about it, but was dismissing it as normal, but feels it's abnormal.  Feels she gains 5 lbs every few weeks.  Talked with OB treating with hormones.    Feels a lot of facial and abdomen weight gain.  A lot of days bloated and \"I look pregnant\", is uncomfortable.   GI did endoscopy, which was fine, and breath tests for SIBO, lactose, so far have been clear.  Weight gain started ~1.5 years ago, started all the migraine medications.  Started Omeprazole for GERD.   Has been very stressed.  Started eating gluten again, had been GF for previous 8 years.  Thinks she may be eating more than she used to.  Eating can sometimes calm the nausea.    Has actually been trying to exercise swimming 4 days a week, stairs for 20 minutes a few times a week.  Uses wheelchair in situations when she'll be standing for prolonged periods.     Used to cook all her own food but can't do that anymore.  Has someone who helps cook and BF.  Trying to eat healthy.  More stress around food.  Feels too dizzy to make things and feels overwhelmed.    Struggling in life.    Used to be stable around 150 lbs.  Grandfather on mom's side with hx T2DM.  [facility]--durga Cox. Had been prescribed Zepbound, hasn't started yet.  Considering after she gets back in town.    Insulin numbers were high previously, referred to [facility], who then referred her to weight management.  Felt too sick with Wegovy, took " "for 4 weeks, significant nausea, another week was okay.    Prolactin level was high, OB wondered about MRI of the brain.    Has very vivid dreams, also snores loudly, doesn't feel rested upon wakening.  Naps 1-2 hours, goes to bed around 10:30 pm and up around 7 am.  Sleep study in April.  Constant migraine impacts, makes her nauseated.    High levels of stress.   Still meets with therapist. Feels stress is practical, spends days making phone calls, not working, relies on her partner a lot and feels overwhelmed by basic things that she can't physically do.  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 Can't be on cadi waiver d/t legal papers d/t a trust from her grandparents. Can't watch the videos for PT. Gets too dizzy running around the house.  Meal prep delivers every other week, and a  every other week.  Would like a job she could do even an hour a day, or have help more in the day.  Coping as well as she can given the circumstances.    Feels she does healthy coping, good social support, distracts herself.    Going to [place] for possible CFS leak.  Dr. Diaz suspects vascular congestion ***  Wondered about silent endometriosis, gets pain and cramping throughout cycle.      {ROS Picklists (Optional):075697}      Objective    /82 (BP Location: Right arm, Patient Position: Sitting, Cuff Size: Adult Regular)   Pulse 98   Temp 98  F (36.7  C) (Oral)   Resp 18   Ht 1.64 m (5' 4.57\")   Wt 84.6 kg (186 lb 8 oz)   SpO2 96%   BMI 31.45 kg/m    Body mass index is 31.45 kg/m .  Physical Exam   {Exam List (Optional):114998}    {Diagnostic Test Results (Optional):894249}        Signed Electronically by: MAJO Reyes CNP  {Email feedback regarding this note to [email or web address]   :[id number]}  " "around 7 am.  Sleep study scheduled in April.  Constant migraine impacts, makes her nauseated.    High levels of stress.   Still meets with therapist. Feels stress is practical, spends days making phone calls, not working, relies on her partner a lot and feels overwhelmed by basic things that she can't physically do.  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 Can't be on cadi waiver d/t legal papers d/t a trust from her grandparents. Can't watch the videos for PT. Gets too dizzy running around the house.  Meal prep delivers every other week, and a  every other week.  Would like a job she could do even an hour a day, or have help more in the day.  Coping as well as she can given the circumstances.    Feels she does healthy coping, good social support, distracts herself.    Going to [place] for possible CFS leak.  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 Diaz's note 2/28/25.  Wondered about silent endometriosis, gets pain and cramping throughout cycle, but doesn't feel her symptoms are as severe as some of her friends who have endometriosis.        Review of Systems  Constitutional, HEENT, cardiovascular, pulmonary, gi and gu systems are negative, except as otherwise noted.      Objective    /82 (BP Location: Right arm, Patient Position: Sitting, Cuff Size: Adult Regular)   Pulse 98   Temp 98  F (36.7  C) (Oral)   Resp 18   Ht 1.64 m (5' 4.57\")   Wt 84.6 kg (186 lb 8 oz)   SpO2 96%   BMI 31.45 kg/m    Body mass index is 31.45 kg/m .  Physical Exam   GENERAL: alert and no distress  NECK: no adenopathy, no asymmetry, masses, or scars  RESP: lungs clear to auscultation - no rales, rhonchi or wheezes  CV: regular rate and rhythm, normal S1 S2, no S3 or S4, no murmur, click or rub, no peripheral edema  ABDOMEN: soft, nontender, no hepatosplenomegaly, no masses and " bowel sounds normal  MS: no gross musculoskeletal defects noted, no edema  NEURO: Normal strength and tone, mentation intact and speech normal  PSYCH: mentation appears normal, affect normal/bright            Signed Electronically by: MAJO Reyes CNP

## 2025-03-10 NOTE — TELEPHONE ENCOUNTER
REFERRAL INFORMATION:  Referring By: Isaias Hong APRN CNP   Referring Clinic: [facility]   Reason for Visit/Diagnosis: DX H69.90 (ICD-10-CM) - Dysfunction of Eustachian tube, unspecified laterality  REF'D by ISAIAS HONG  SCHEDULED W/ PT  [facility] verified      FUTURE VISIT INFORMATION:  Appointment Date: 6/4/25  Appointment Time: 12 PM     NOTES STATUS DETAILS   OFFICE NOTE from referring provider [facility] INTERNAL MEDICINE   12/27/24: Isaias Hong APRN CNP    IMAGING *pertaining images & report*       CT, MRI, PET, NM, US, XRAYS [facility]/ PACS 2/21/25 CTV head neck  8/27/24 MR brain  8/7/23 MR brain

## 2025-03-28 ENCOUNTER — MYC MEDICAL ADVICE (OUTPATIENT)
Dept: INTERNAL MEDICINE | Facility: CLINIC | Age: 35
End: 2025-03-28
Payer: COMMERCIAL

## 2025-04-10 ENCOUNTER — MYC MEDICAL ADVICE (OUTPATIENT)
Dept: INTERNAL MEDICINE | Facility: CLINIC | Age: 35
End: 2025-04-10
Payer: COMMERCIAL

## 2025-04-10 NOTE — TELEPHONE ENCOUNTER
Left Voicemail (1st Attempt) and Sent Mychart (1st Attempt) for the patient to call back and schedule the following:    Appointment type: Video Visit   Provider: PCP  Return date: April 15th   Specialty phone number: [phone]  Additonal Notes: per message -  I have a form for Екатерина that I need some clarification on--can you help her schedule a virtual visit on 4/15/25 to review this with me

## 2025-04-13 ASSESSMENT — SLEEP AND FATIGUE QUESTIONNAIRES
HOW LIKELY ARE YOU TO NOD OFF OR FALL ASLEEP IN A CAR, WHILE STOPPED FOR A FEW MINUTES IN TRAFFIC: WOULD NEVER DOZE
HOW LIKELY ARE YOU TO NOD OFF OR FALL ASLEEP WHILE SITTING INACTIVE IN A PUBLIC PLACE: WOULD NEVER DOZE
HOW LIKELY ARE YOU TO NOD OFF OR FALL ASLEEP WHEN YOU ARE A PASSENGER IN A CAR FOR AN HOUR WITHOUT A BREAK: SLIGHT CHANCE OF DOZING
HOW LIKELY ARE YOU TO NOD OFF OR FALL ASLEEP WHILE SITTING QUIETLY AFTER LUNCH WITHOUT ALCOHOL: SLIGHT CHANCE OF DOZING
HOW LIKELY ARE YOU TO NOD OFF OR FALL ASLEEP WHILE SITTING AND TALKING TO SOMEONE: WOULD NEVER DOZE
HOW LIKELY ARE YOU TO NOD OFF OR FALL ASLEEP WHILE SITTING AND READING: SLIGHT CHANCE OF DOZING
HOW LIKELY ARE YOU TO NOD OFF OR FALL ASLEEP WHILE WATCHING TV: SLIGHT CHANCE OF DOZING
HOW LIKELY ARE YOU TO NOD OFF OR FALL ASLEEP WHILE LYING DOWN TO REST IN THE AFTERNOON WHEN CIRCUMSTANCES PERMIT: HIGH CHANCE OF DOZING

## 2025-04-14 ENCOUNTER — TELEPHONE (OUTPATIENT)
Dept: SLEEP MEDICINE | Facility: CLINIC | Age: 35
End: 2025-04-14
Payer: COMMERCIAL

## 2025-04-14 ENCOUNTER — TELEPHONE (OUTPATIENT)
Dept: INTERNAL MEDICINE | Facility: CLINIC | Age: 35
End: 2025-04-14
Payer: COMMERCIAL

## 2025-04-14 NOTE — TELEPHONE ENCOUNTER
Returned pt's call from over the weekend stating that she would like to reschedule her sleep study scheduled for this Friday, 4/18 for earlier in the wee if possible.  I left a VM letting her know that I do not currently have anything available for earlier this week, but will hold a bed for 4/25 for her until 11:30 today and then will have to open it back up if I have not heard from her by them.  I also let her know that I am then scheduling into June and July.    DANY Sarmiento [facility] Hospital[facility]IGUEL ÁNGEL, Clinical Specialist - [place]

## 2025-04-14 NOTE — TELEPHONE ENCOUNTER
Left Voicemail (2nd Attempt) for the patient to call back and schedule the following:    Appointment type: VIDEO VISIT  Provider: Geri KASPER CNP   Return date: next available   Specialty phone number: [phone]  Additional appointment(s) needed: -  Additonal Notes: PCP has requested a video visit to complete forms. Please schedule next available w PCP only

## 2025-04-15 ENCOUNTER — THERAPY VISIT (OUTPATIENT)
Dept: SLEEP MEDICINE | Facility: CLINIC | Age: 35
End: 2025-04-15
Attending: INTERNAL MEDICINE
Payer: COMMERCIAL

## 2025-04-15 DIAGNOSIS — G47.10 HYPERSOMNIA, UNSPECIFIED: ICD-10-CM

## 2025-04-15 DIAGNOSIS — G47.8 OTHER SLEEP DISORDERS: ICD-10-CM

## 2025-04-15 DIAGNOSIS — R06.83 SNORING: ICD-10-CM

## 2025-04-22 ENCOUNTER — VIRTUAL VISIT (OUTPATIENT)
Dept: INTERNAL MEDICINE | Facility: CLINIC | Age: 35
End: 2025-04-22
Payer: COMMERCIAL

## 2025-04-22 ENCOUNTER — MYC MEDICAL ADVICE (OUTPATIENT)
Dept: NEUROLOGY | Facility: CLINIC | Age: 35
End: 2025-04-22

## 2025-04-22 DIAGNOSIS — R05.2 SUBACUTE COUGH: ICD-10-CM

## 2025-04-22 DIAGNOSIS — G43.709 CHRONIC MIGRAINE WITHOUT AURA WITHOUT STATUS MIGRAINOSUS, NOT INTRACTABLE: ICD-10-CM

## 2025-04-22 DIAGNOSIS — G43.809 VESTIBULAR MIGRAINE: ICD-10-CM

## 2025-04-22 DIAGNOSIS — Z02.89 ENCOUNTER FOR COMPLETION OF FORM WITH PATIENT: Primary | ICD-10-CM

## 2025-04-22 DIAGNOSIS — G43.711 INTRACTABLE CHRONIC MIGRAINE WITHOUT AURA AND WITH STATUS MIGRAINOSUS: ICD-10-CM

## 2025-04-22 DIAGNOSIS — Q79.62 HYPERMOBILE EHLERS-DANLOS SYNDROME: ICD-10-CM

## 2025-04-22 DIAGNOSIS — G89.4 CHRONIC PAIN DISORDER: ICD-10-CM

## 2025-04-22 PROBLEM — F33.1 MAJOR DEPRESSIVE DISORDER, RECURRENT EPISODE, MODERATE (H): Status: RESOLVED | Noted: 2022-05-04 | Resolved: 2025-04-22

## 2025-04-22 PROBLEM — F41.1 GENERALIZED ANXIETY DISORDER: Status: RESOLVED | Noted: 2022-05-04 | Resolved: 2025-04-22

## 2025-04-22 PROCEDURE — 98007 SYNCH AUDIO-VIDEO EST HI 40: CPT | Performed by: NURSE PRACTITIONER

## 2025-04-22 RX ORDER — ALBUTEROL SULFATE 90 UG/1
2 INHALANT RESPIRATORY (INHALATION) EVERY 6 HOURS PRN
Qty: 18 G | Refills: 0 | Status: SHIPPED | OUTPATIENT
Start: 2025-04-22

## 2025-04-22 RX ORDER — VENLAFAXINE HYDROCHLORIDE 37.5 MG/1
75 TABLET, EXTENDED RELEASE ORAL DAILY
Qty: 180 TABLET | Refills: 3 | Status: SHIPPED | OUTPATIENT
Start: 2025-04-22

## 2025-04-22 ASSESSMENT — PATIENT HEALTH QUESTIONNAIRE - PHQ9
SUM OF ALL RESPONSES TO PHQ QUESTIONS 1-9: 4
SUM OF ALL RESPONSES TO PHQ QUESTIONS 1-9: 4
10. IF YOU CHECKED OFF ANY PROBLEMS, HOW DIFFICULT HAVE THESE PROBLEMS MADE IT FOR YOU TO DO YOUR WORK, TAKE CARE OF THINGS AT HOME, OR GET ALONG WITH OTHER PEOPLE: EXTREMELY DIFFICULT

## 2025-04-22 NOTE — PROGRESS NOTES
"Екатерина is a 34 year old who is being evaluated via a billable video visit.          Assessment & Plan     Encounter for completion of form with patient  Hypermobile Harvey-Danlos syndrome  Chronic pain disorder   Vestibular migraine  Intractable chronic migraine without aura without status migrainosus  Forms completed for [employer] and [employer] for modified scuba classes--see HPI.  She would benefit from a service dog and no apparent medical contraindications to scuba diving classes.  Continues following with Dr. Diaz in Neurology.    Subacute cough  Cough present since influenza ~2 weeks ago, with possible intermittent wheezing.   No hx asthma. Will provide albuterol inhaler for post-viral bronchospasm. Reviewed instructions for use.  Recommend mucinex to help clear mucus, as well as maintain good hydration.  - albuterol (PROAIR HFA/PROVENTIL HFA/VENTOLIN HFA) 108 (90 Base) MCG/ACT inhaler; Inhale 2 puffs into the lungs every 6 hours as needed for shortness of breath, wheezing or cough.      Follow-up  Return if symptoms worsen or fail to improve.      48 minutes spent by me on the date of the encounter doing chart review, history and exam, documentation and further activities per the note      Subjective   Екатерина is a 34 year old, presenting for the following health issues:  RECHECK, Forms (Two forms attached to a [employer] msg. ), and Cough (Lingering cough )      Video Start Time: 9:59 AM    History of Present Illness       Reason for visit:  Form + lingering cough   She is taking medications regularly.          Form for \"[employer]\"--looking for mobility assist dog.  IHS worker Angela (hours range 2-4 per week, hoping to increase to 6hr/week in next month) recommended service dog.  Help with moving things around the house, helping support if dizziness, mostly for assistance at home.  Went to open house and was impressed with amount of assistance they could provide.  Can take up to 1 year to obtain " "dog.  Has applied for PCA services, would benefit from this (hanging clothes, putting things away), but logistical issues with CADI waiver.  Hard to turn rapidly.  Continues in PT at [facility], once every 3 weeks, does exercises daily, to help with general mobility, strengthening neck, foot, EDS and stability.  Swims most days.    Does \"coaching\" from a therapist in [place].  Couples therapist--Kaitlyn Smith at [facility]  Met with psychologist 1x, diagnosed with several things in ~2021.  Doesn't feel she has major depression or LA.  Symptoms are more cyclical, PMDD.      Form for scuba diving through [employer]--scuba in a pool.  Adaptive scuba class. One of her goals to get back into this, loves this.  +motion sickness, hasn't impacted previously.  +migraines. Fax form and she will complete her portion there.    Seen at [facility] 4/7/25--Influenza B.  Lingering cough since then.  Has been the same, worsens at night. Feels like mucus \"stuck\", difficult to get it out.  Tried mucinex a couple times, helped somewhat.  Trying to stay hydrated.  +postnasal drip.  Was using saline nasal rinses.  Did a flonase, but stopped this, was using right before bed.  Wakes in the middle of the night coughing.  Possibly some wheezing.    Has had pertussis in the past.  DId a virutual visit, was prescribed an inhaler but hasn't picked it up yet.       Review of Systems  Constitutional, HEENT, cardiovascular, pulmonary, gi and gu systems are negative, except as otherwise noted.      Objective             Physical Exam   GENERAL: alert and no distress  EYES: Eyes grossly normal to inspection.  No discharge or erythema, or obvious scleral/conjunctival abnormalities.  RESP: No audible wheeze, or visible cyanosis.  Intermittent cough.  SKIN: Visible skin clear. No significant rash, abnormal pigmentation or lesions.  NEURO: Cranial nerves grossly intact.  Mentation and speech appropriate for age.  PSYCH: Appropriate " affect, tone, and pace of words          Video-Visit Details    Type of service:  Video Visit   Video End Time:10:24 AM  Originating Location (pt. Location): Home    Distant Location (provider location):  Off-site  Platform used for Video Visit: Volodymyr  Signed Electronically by: MAJO Reyes CNP

## 2025-04-22 NOTE — NURSING NOTE
Current patient location: [address]  [facility] [address]    Is the patient currently in the state of [place]? YES    Visit mode: VIDEO    If the visit is dropped, the patient can be reconnected by:VIDEO VISIT: Text to cell phone:   Telephone Information:   Mobile [phone]       Will anyone else be joining the visit? NO  (If patient encounters technical issues they should call [phone])    Are changes needed to the allergy or medication list? No    Are refills needed on medications prescribed by this physician? NO    Rooming Documentation:  Questionnaire(s) completed    Reason for visit: RECHECK, Forms (Two forms attached to a mychart msg. ), and Cough (Lingering cough )    Michelle OROZCO

## 2025-04-22 NOTE — PATIENT INSTRUCTIONS
Thank you for visiting the Primary Care Center today at the [facility]! The following is some information about our clinic:     Primary Care Center Frequently-Asked Questions    (1) How do I schedule appointments at the [facility]?     Primary Care--to schedule or make changes to an existing appointment, please call our primary care line at [phone].    Labs--to schedule a lab appointment at the [facility] you can use [employer] or call [phone]. If you have a [place] location that is closer to home, you can reach out to that location for scheduling options.     Imaging--if you need to schedule a CT, X-ray, MRI, ultrasound, or other imaging study you can call [phone] to schedule at the [facility] or any other [facility] imaging location.     Referrals--if a referral to another specialty was ordered you can expect a phone call from their scheduling team. If you have not heard from them in a week, please call us or send us a [employer] message to check the status or get a scheduling number. Please note that this only applies to internal [facility] referrals. If the referral is external you would need to contact their office for scheduling.     (2) I have a question about my visit, who do I contact?     You can call us at the primary care line at [phone] to ask questions about your visit. You can also send a secure message through [employer], which is reviewed by clinic staff. Please note that [employer] messages have a twenty-four to forty-eight business hour turnaround time and should not be used for urgent concerns.    (3) How will I get the results of my tests?    If you are signed up for [employer] all tests will be released to you within twenty-four hours of resulting. Please allow three to five days for your doctor to review your results and place a note interpreting the results. If you do not have [employer] you will receive your  results through mail seven to ten business days following the return of the tests. Please note that if there should be any urgent or concerning results that your doctor or their registered nurse will reach out to you the same day as the tests come back. If you have follow up questions about your results or would like to discuss the results in detail please schedule a follow up with your provider either in person or virtually.     (4) How do I get refills of my prescriptions?     You should always first contact your pharmacy for refills of your medications. If submitting a refill request on [employer], please be sure to submit the request only once--repeat requests can cause delays in refill. If you are requesting a NEW medication or a medication related to new symptoms you will need to schedule an appointment with a provider prior to approval. Please note: Routine medication refills have up to one to three business day turnaround whereas controlled substances refills have up to five to seven business day turnaround.    (5) I have new symptoms, what do I do?     If you are having an immediate medical emergency, you should dial 911 for assistance.   For anything urgent that needs to be seen within a few hours to one day you should visit a local urgent care for assistance.  For non-urgent symptoms that need to be seen within a few days to a week you can schedule with an available provider in primary care by going to [employer] or calling [phone].   If you are not sure how serious your symptoms are or you would like to receive medical advice you can always call [phone] to speak with a triage nurse.

## 2025-04-23 ENCOUNTER — TELEPHONE (OUTPATIENT)
Dept: NEUROLOGY | Facility: CLINIC | Age: 35
End: 2025-04-23
Payer: COMMERCIAL

## 2025-04-23 NOTE — TELEPHONE ENCOUNTER
Prior Authorization Retail Medication Request    Medication/Dose: venlafaxine (EFFEXOR-ER) 37.5 MG 24 hr tablet  Diagnosis and ICD code (if different than what is on RX):        New/renewal/insurance change PA/secondary ins. PA:  Previously Tried and Failed:    Rationale:      Insurance   Primary: BLUE PLUS - BLUE PLUS ADVANTAGE [place]   Insurance ID:  [id number]      Secondary (if applicable):  Insurance ID:      Pharmacy Information (if different than what is on RX)  Name:  [facility]   Phone:  [phone]   Fax:[phone]     Clinic Information  Preferred routing pool for dept communication:

## 2025-04-25 PROBLEM — M25.532 PAIN IN BOTH WRISTS: Status: RESOLVED | Noted: 2025-01-27 | Resolved: 2025-04-25

## 2025-04-25 PROBLEM — M25.531 PAIN IN BOTH WRISTS: Status: RESOLVED | Noted: 2025-01-27 | Resolved: 2025-04-25

## 2025-04-26 NOTE — TELEPHONE ENCOUNTER
PA Initiation    Medication: VENLAFAXINE HCL ER 37.5 MG PO TB24  Insurance Company: [employer] - Phone [phone] Fax [phone]  Pharmacy Filling the Rx: [facility] -- [place], [place] - [address]  Filling Pharmacy Phone: [phone]  Filling Pharmacy Fax: [phone]  Start Date: 4/26/2025

## 2025-04-28 NOTE — TELEPHONE ENCOUNTER
PRIOR AUTHORIZATION DENIED    Medication: VENLAFAXINE HCL ER 37.5 MG PO TB24    Insurance Company: [email or web address] Clinical Review - Phone [phone] Fax [phone]    Denial Date: 4/26/2025    Denial Reason(s): Plan covers up to 1 tab per day.  Plan would want patient to use the 75mg tab    Appeal Information:

## 2025-05-05 ENCOUNTER — MYC MEDICAL ADVICE (OUTPATIENT)
Dept: NEUROLOGY | Facility: CLINIC | Age: 35
End: 2025-05-05
Payer: COMMERCIAL

## 2025-05-05 DIAGNOSIS — G43.709 CHRONIC MIGRAINE WITHOUT AURA WITHOUT STATUS MIGRAINOSUS, NOT INTRACTABLE: ICD-10-CM

## 2025-05-05 RX ORDER — VENLAFAXINE HYDROCHLORIDE 75 MG/1
75 TABLET, EXTENDED RELEASE ORAL DAILY
Qty: 90 TABLET | Refills: 3 | Status: SHIPPED | OUTPATIENT
Start: 2025-05-05

## 2025-06-04 ENCOUNTER — PRE VISIT (OUTPATIENT)
Dept: OTOLARYNGOLOGY | Facility: CLINIC | Age: 35
End: 2025-06-04

## 2025-06-16 ENCOUNTER — TRANSFERRED RECORDS (OUTPATIENT)
Dept: MULTI SPECIALTY CLINIC | Facility: CLINIC | Age: 35
End: 2025-06-16
Payer: COMMERCIAL

## 2025-06-16 LAB
ALT SERPL-CCNC: 24 IU/L (ref 0–32)
AST SERPL-CCNC: 15 IU/L (ref 0–40)

## 2025-06-17 ENCOUNTER — ANCILLARY PROCEDURE (OUTPATIENT)
Dept: CT IMAGING | Facility: CLINIC | Age: 35
End: 2025-06-17
Attending: PSYCHIATRY & NEUROLOGY
Payer: COMMERCIAL

## 2025-06-17 ENCOUNTER — TRANSFERRED RECORDS (OUTPATIENT)
Dept: ADMINISTRATIVE | Facility: CLINIC | Age: 35
End: 2025-06-17

## 2025-06-17 DIAGNOSIS — I87.1 COMPRESSION OF VEIN: ICD-10-CM

## 2025-06-17 DIAGNOSIS — N94.89 PELVIC CONGESTION: ICD-10-CM

## 2025-06-17 DIAGNOSIS — I87.1 MAY-THURNER SYNDROME: ICD-10-CM

## 2025-06-17 DIAGNOSIS — I87.1 NUTCRACKER PHENOMENON OF RENAL VEIN: ICD-10-CM

## 2025-06-17 PROCEDURE — 74174 CTA ABD&PLVS W/CONTRAST: CPT | Performed by: RADIOLOGY

## 2025-06-17 RX ORDER — IODIXANOL 320 MG/ML
100 INJECTION, SOLUTION INTRAVASCULAR ONCE
Status: COMPLETED | OUTPATIENT
Start: 2025-06-17 | End: 2025-06-17

## 2025-06-17 RX ADMIN — IODIXANOL 100 ML: 320 INJECTION, SOLUTION INTRAVASCULAR at 12:22

## 2025-06-17 NOTE — DISCHARGE INSTRUCTIONS

## 2025-06-18 NOTE — PROCEDURES
2025        Екатерина Lucero   [address] [place] [address]-      Екатерина Lucero,  :  1990    I am writing to let you know the results of the tests that were done the other day.   Thank you for allowing [facility] the opportunity to take part in your healthcare.  At [facility] we strive to provide each patient with the finest gastroenterology care available.  We hope your experience was pleasant and informative.    Your labs are normal.    I hope you are feeling well.        Thank you.    Electronically signed by:  Diya Vogt MD 2025 09:44 AM  Document generated by:  Diya Vogt MD  2025  If your provider ordered multiple tests; the results may not become available at the same time.  If multiple test results are received within 14 days of one another, you may receive a duplicate.  cc:  Geri Loyola CNP

## 2025-06-24 ENCOUNTER — RESULTS FOLLOW-UP (OUTPATIENT)
Dept: NEUROLOGY | Facility: CLINIC | Age: 35
End: 2025-06-24

## 2025-06-24 DIAGNOSIS — M24.20 EAGLE SYNDROME: Primary | ICD-10-CM

## 2025-06-24 DIAGNOSIS — I87.1 COMPRESSION OF VEIN: Primary | ICD-10-CM

## 2025-06-24 DIAGNOSIS — R51.9 HEADACHE: ICD-10-CM

## 2025-06-24 DIAGNOSIS — I87.1 COMPRESSION OF VEIN: ICD-10-CM

## 2025-06-24 RX ORDER — METHYLPREDNISOLONE 32 MG/1
TABLET ORAL
Qty: 2 TABLET | Refills: 0 | Status: SHIPPED | OUTPATIENT
Start: 2025-06-24

## 2025-06-24 RX ORDER — FUROSEMIDE 20 MG/1
TABLET ORAL
Qty: 12 TABLET | Refills: 1 | Status: SHIPPED | OUTPATIENT
Start: 2025-06-24

## 2025-06-25 ENCOUNTER — MYC MEDICAL ADVICE (OUTPATIENT)
Dept: INTERNAL MEDICINE | Facility: CLINIC | Age: 35
End: 2025-06-25

## 2025-07-07 ENCOUNTER — LAB (OUTPATIENT)
Dept: LAB | Facility: CLINIC | Age: 35
End: 2025-07-07
Payer: COMMERCIAL

## 2025-07-07 DIAGNOSIS — F90.0 ATTENTION DEFICIT HYPERACTIVITY DISORDER, PREDOMINANTLY INATTENTIVE TYPE: ICD-10-CM

## 2025-07-07 DIAGNOSIS — E60 ZINC DEFICIENCY: ICD-10-CM

## 2025-07-07 DIAGNOSIS — E22.1 HYPERPROLACTINEMIA: ICD-10-CM

## 2025-07-07 DIAGNOSIS — E29.1 ANDROGEN DEFICIENCY: ICD-10-CM

## 2025-07-07 DIAGNOSIS — E61.1 IRON DEFICIENCY: ICD-10-CM

## 2025-07-07 DIAGNOSIS — E28.2 POLYCYSTIC OVARIAN SYNDROME: ICD-10-CM

## 2025-07-07 LAB
ESTRADIOL SERPL-MCNC: 66 PG/ML
FERRITIN SERPL-MCNC: 43 NG/ML (ref 6–175)
PROLACTIN SERPL 3RD IS-MCNC: 23 NG/ML (ref 5–23)

## 2025-07-07 PROCEDURE — 36415 COLL VENOUS BLD VENIPUNCTURE: CPT

## 2025-07-07 PROCEDURE — 82728 ASSAY OF FERRITIN: CPT

## 2025-07-07 PROCEDURE — 84403 ASSAY OF TOTAL TESTOSTERONE: CPT

## 2025-07-07 PROCEDURE — 82670 ASSAY OF TOTAL ESTRADIOL: CPT

## 2025-07-07 PROCEDURE — 84146 ASSAY OF PROLACTIN: CPT

## 2025-07-07 PROCEDURE — 82627 DEHYDROEPIANDROSTERONE: CPT

## 2025-07-07 PROCEDURE — 84630 ASSAY OF ZINC: CPT

## 2025-07-08 LAB
DHEA-S SERPL-MCNC: 259 UG/DL (ref 35–430)
TESTOST SERPL-MCNC: 22 NG/DL (ref 8–60)
ZINC SERPL-MCNC: 68.4 UG/DL

## 2025-07-11 PROBLEM — D12.6 ADENOMATOUS POLYP OF COLON: Status: ACTIVE | Noted: 2025-07-11

## 2025-07-16 ENCOUNTER — TELEPHONE (OUTPATIENT)
Dept: NEUROLOGY | Facility: CLINIC | Age: 35
End: 2025-07-16
Payer: COMMERCIAL

## 2025-07-16 DIAGNOSIS — G43.709 CHRONIC MIGRAINE WITHOUT AURA WITHOUT STATUS MIGRAINOSUS, NOT INTRACTABLE: ICD-10-CM

## 2025-07-16 DIAGNOSIS — R42 VERTIGO: Primary | ICD-10-CM

## 2025-07-16 RX ORDER — VENLAFAXINE HYDROCHLORIDE 75 MG/1
75 CAPSULE, EXTENDED RELEASE ORAL DAILY
Qty: 30 CAPSULE | Refills: 11 | Status: SHIPPED | OUTPATIENT
Start: 2025-07-16

## 2025-07-16 NOTE — TELEPHONE ENCOUNTER
Health Call Center    Phone Message    May a detailed message be left on voicemail: yes     Reason for Call: Medication Question or concern regarding medication   Prescription Clarification  Name of Medication: venlafaxine (EFFEXOR-ER) 75 MG 24 hr tablet   Prescribing Provider: Shazia Amaya   Pharmacy:   [facility] -- [place], [place] - [address] [place] [address]. [address]      What on the order needs clarification? Pharmacy called and stated that the Pt called and asked for her medication to be put in capsules instead of tablets form. Pharmacy would like a call back to discuss.       Action Taken: Message routed to:  [facility] Neurology    Travel Screening: Not Applicable     Date of Service:

## 2025-07-17 DIAGNOSIS — I87.1 COMPRESSION OF VEIN: Primary | ICD-10-CM

## 2025-07-18 ENCOUNTER — OFFICE VISIT (OUTPATIENT)
Dept: NEUROLOGY | Facility: CLINIC | Age: 35
End: 2025-07-18
Payer: COMMERCIAL

## 2025-07-18 VITALS
OXYGEN SATURATION: 99 % | DIASTOLIC BLOOD PRESSURE: 71 MMHG | HEART RATE: 77 BPM | RESPIRATION RATE: 16 BRPM | SYSTOLIC BLOOD PRESSURE: 119 MMHG

## 2025-07-18 DIAGNOSIS — F90.9 ATTENTION DEFICIT HYPERACTIVITY DISORDER (ADHD), UNSPECIFIED ADHD TYPE: ICD-10-CM

## 2025-07-18 DIAGNOSIS — G43.709 CHRONIC MIGRAINE WITHOUT AURA WITHOUT STATUS MIGRAINOSUS, NOT INTRACTABLE: Primary | ICD-10-CM

## 2025-07-18 PROCEDURE — 99214 OFFICE O/P EST MOD 30 MIN: CPT | Performed by: PSYCHIATRY & NEUROLOGY

## 2025-07-18 RX ORDER — CHASTE TREE FRUIT 400 MG
2 CAPSULE ORAL DAILY
COMMUNITY

## 2025-07-18 RX ORDER — IBUPROFEN 200 MG
200 TABLET ORAL PRN
COMMUNITY
Start: 2025-01-30

## 2025-07-18 RX ORDER — CHOLECALCIFEROL (VITD3)/VIT K2 25-90 MCG
1 TABLET,DISINTEGRATING ORAL DAILY
COMMUNITY

## 2025-07-18 RX ORDER — CALCIUM CARBONATE 260MG(650)
2 TABLET,CHEWABLE ORAL EVERY 24 HOURS
COMMUNITY

## 2025-07-18 RX ORDER — LISDEXAMFETAMINE DIMESYLATE 10 MG/1
10 CAPSULE ORAL EVERY MORNING
Qty: 30 CAPSULE | Refills: 0 | Status: SHIPPED | OUTPATIENT
Start: 2025-07-18

## 2025-07-18 RX ORDER — UBIDECARENONE 100 MG
100 CAPSULE ORAL DAILY
COMMUNITY

## 2025-07-18 NOTE — LETTER
7/18/2025       RE: Екатерина Lucero  [address]  [facility] [address]     Dear Colleague,    Thank you for referring your patient, Екатерина Lucero, to the [facility] [place] at [facility]. Please see a copy of my visit note below.    Neurology Progress Note    [facility]    Екатерина Lucero MRN# [id number]   Age: 35 year old YOB: 1990     PCP: Geri Loyola            Assessment and Plan:     Chronic migraine without aura without status migrainosus, not intractable:  - Continue Venlafaxine and Nurtec as approved by insurance, 16 tablets per month. Consider dietary modifications such as Whole 30 or keto to identify potential dietary triggers.    Attention deficit hyperactivity disorder (ADHD), unspecified ADHD type:  - Restart Vyvanse at 10 mg daily while coordinating with psychiatrist for long-term management. 30 day rx given, I explained I will not manage long term.     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  - Follow up with Dr. Diaz to discuss surgical options. Consider consultation with national-level surgeons for personalized surgical approach.  - Risks and side effects: Discussed potential risks of surgery, including swallowing difficulties.    Cognitive decline/brain fog:  - Potential exacerbation due to chronic illness and pre-existing ADHD.  - Consider neuropsychological testing to assess cognitive function and identify areas for intervention. Coordinate with psychiatrist for ADHD management.    Menstrual cycle-related symptoms:  - Menstrual cycle may be a significant trigger for migraines and mood changes.  - Consider consultation with a gynecologist to explore hormonal therapy options to manage menstrual cycle-related symptoms.  - Risks and side effects: Discussed potential risks associated with hormonal therapy, especially in the  context of migraine with aura and antiphospholipid antibodies.    I spent 35 minutes caring for the patient on the day of the visit. This including all visit time.     Shazia Amaya MD           History of Present Illness:   CC: Recheck    The patient consented to use of [employer] for note generation during the appointment.     Last visit with me 2/7/25    Екатерина Lucero, 35-year-old female  - Chronic migraine since 2016, with significant cognitive decline and brain fog since onset; difficulty recalling information, word-finding issues, slowed processing, and social withdrawal due to cognitive symptoms  - History of ADHD, previously treated with Vyvanse, which worsened dizziness at that time; dizziness currently improved  - Severe menstrual-related migraine and mood symptoms, especially in the luteal phase; reports life is significantly impacted by menstrual cycle  - History of major GI workup in the past year, including endoscopy and colonoscopy; found precancerous polyps, no celiac disease, awaiting biopsy results  - Migraine symptoms and cognitive issues persist despite current treatments; reports improvement in anxiety with venlafaxine 75 mg  - Dietary interventions trialed in the past, including Whole30 and keto; keto provided most benefit  - Imaging revealed right styloid process 9 mm, left 13 mm, with calcified ligament on left; ongoing evaluation for [place] syndrome with Dr Diaz.   - Reports chronic neck tightness and pain, especially on left side  - No current use of acetazolamide  - No history of high blood pressure or high cholesterol  - Past elevated antiphospholipid antibodies, previously evaluated by rheumatology and hematology    Previous medication trials:  Zavzpret was not tolerated-Tasted horrible for hours, irritation of sinuses and on help for headache.   She wants Nurtec as her primary rescue it has worked the best of all 3.   Ubrelvy is no help  Previously tried and failed numerous triptans  "including: rizatriptan, sumatriptan and naratriptan.      Venlafaxine ER 75 mg daily is being used. (Might be causing weight gain and brain fog) wondering if she can go back to 37.5 mg dosing  Previous preventive trials include:  Emgality-made legs feel veyr painful  Qulipta ineffective  Gabapentin brief trial not tolerated  Cyclobenzaprine 5 mg at night initially helped for first few days then shot back up. Only using PRN  PT on neck (neck is tense because it is overworking from EDS does not have \"true cervical cranial instability\"  Topiramate not tried history of eating disorder best to avoid. Also has diagnosis of ADHD might make that worse  Low dose Naltrexone started for PCOS and autoimmune stuff. No effect on headache  Neurivio for prevention and acute. Using helps a little  Magnesium oxide no help         Physical Exam:     /71 (BP Location: Right arm, Patient Position: Sitting, Cuff Size: Adult Large)   Pulse 77   Resp 16   SpO2 99%   Otherwise deferred due to lack of time.          Pertinent History/Data for appointment:     Dr Diaz's note reviewed.   Imaging reviewed    Again, thank you for allowing me to participate in the care of your patient.      Sincerely,    Shazia Amaya MD    "

## 2025-07-18 NOTE — PROGRESS NOTES
Neurology Progress Note    [facility]    Екатерина Lucero MRN# [id number]   Age: 35 year old YOB: 1990     PCP: Geri Loyola            Assessment and Plan:     Chronic migraine without aura without status migrainosus, not intractable:  - Continue Venlafaxine and Nurtec as approved by insurance, 16 tablets per month. Consider dietary modifications such as Whole 30 or keto to identify potential dietary triggers.    Attention deficit hyperactivity disorder (ADHD), unspecified ADHD type:  - Restart Vyvanse at 10 mg daily while coordinating with psychiatrist for long-term management. 30 day rx given, I explained I will not manage long term.     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  - Follow up with Dr. Diaz to discuss surgical options. Consider consultation with national-level surgeons for personalized surgical approach.  - Risks and side effects: Discussed potential risks of surgery, including swallowing difficulties.    Cognitive decline/brain fog:  - Potential exacerbation due to chronic illness and pre-existing ADHD.  - Consider neuropsychological testing to assess cognitive function and identify areas for intervention. Coordinate with psychiatrist for ADHD management.    Menstrual cycle-related symptoms:  - Menstrual cycle may be a significant trigger for migraines and mood changes.  - Consider consultation with a gynecologist to explore hormonal therapy options to manage menstrual cycle-related symptoms.  - Risks and side effects: Discussed potential risks associated with hormonal therapy, especially in the context of migraine with aura and antiphospholipid antibodies.    I spent 35 minutes caring for the patient on the day of the visit. This including all visit time.     Shazia Amaya MD           History of Present Illness:   CC: Recheck    The patient consented to use of [employer] for note generation during the  appointment.     Last visit with me 2/7/25    Екатерина Lucero, 35-year-old female  - Chronic migraine since 2016, with significant cognitive decline and brain fog since onset; difficulty recalling information, word-finding issues, slowed processing, and social withdrawal due to cognitive symptoms  - History of ADHD, previously treated with Vyvanse, which worsened dizziness at that time; dizziness currently improved  - Severe menstrual-related migraine and mood symptoms, especially in the luteal phase; reports life is significantly impacted by menstrual cycle  - History of major GI workup in the past year, including endoscopy and colonoscopy; found precancerous polyps, no celiac disease, awaiting biopsy results  - Migraine symptoms and cognitive issues persist despite current treatments; reports improvement in anxiety with venlafaxine 75 mg  - Dietary interventions trialed in the past, including Whole30 and keto; keto provided most benefit  - Imaging revealed right styloid process 9 mm, left 13 mm, with calcified ligament on left; ongoing evaluation for [place] syndrome with Dr Diaz.   - Reports chronic neck tightness and pain, especially on left side  - No current use of acetazolamide  - No history of high blood pressure or high cholesterol  - Past elevated antiphospholipid antibodies, previously evaluated by rheumatology and hematology    Previous medication trials:  Zavzpret was not tolerated-Tasted horrible for hours, irritation of sinuses and on help for headache.   She wants Nurtec as her primary rescue it has worked the best of all 3.   Ubrelvy is no help  Previously tried and failed numerous triptans including: rizatriptan, sumatriptan and naratriptan.      Venlafaxine ER 75 mg daily is being used. (Might be causing weight gain and brain fog) wondering if she can go back to 37.5 mg dosing  Previous preventive trials include:  Emgality-made legs feel veyr painful  Qulipta ineffective  Gabapentin brief trial not  "tolerated  Cyclobenzaprine 5 mg at night initially helped for first few days then shot back up. Only using PRN  PT on neck (neck is tense because it is overworking from EDS does not have \"true cervical cranial instability\"  Topiramate not tried history of eating disorder best to avoid. Also has diagnosis of ADHD might make that worse  Low dose Naltrexone started for PCOS and autoimmune stuff. No effect on headache  Neurivio for prevention and acute. Using helps a little  Magnesium oxide no help         Physical Exam:     /71 (BP Location: Right arm, Patient Position: Sitting, Cuff Size: Adult Large)   Pulse 77   Resp 16   SpO2 99%   Otherwise deferred due to lack of time.          Pertinent History/Data for appointment:     Dr Diaz's note reviewed.   Imaging reviewed  "

## 2025-07-18 NOTE — NURSING NOTE
Chief Complaint   Patient presents with    RECHECK     Return Headache         /71 (BP Location: Right arm, Patient Position: Sitting, Cuff Size: Adult Large)   Pulse 77   Resp 16   SpO2 99%         Gloria Garcia

## 2025-07-22 ENCOUNTER — LAB (OUTPATIENT)
Dept: LAB | Facility: CLINIC | Age: 35
End: 2025-07-22
Payer: COMMERCIAL

## 2025-07-22 DIAGNOSIS — E22.1 HYPERPROLACTINEMIA: ICD-10-CM

## 2025-07-22 DIAGNOSIS — E28.2 POLYCYSTIC OVARIAN SYNDROME: ICD-10-CM

## 2025-07-22 DIAGNOSIS — E29.1 ANDROGEN DEFICIENCY: ICD-10-CM

## 2025-07-22 LAB
ESTRADIOL SERPL-MCNC: 39 PG/ML
PROLACTIN SERPL 3RD IS-MCNC: 25 NG/ML (ref 5–23)

## 2025-07-22 PROCEDURE — 84146 ASSAY OF PROLACTIN: CPT

## 2025-07-22 PROCEDURE — 82627 DEHYDROEPIANDROSTERONE: CPT

## 2025-07-22 PROCEDURE — 84403 ASSAY OF TOTAL TESTOSTERONE: CPT

## 2025-07-22 PROCEDURE — 36415 COLL VENOUS BLD VENIPUNCTURE: CPT

## 2025-07-22 PROCEDURE — 82670 ASSAY OF TOTAL ESTRADIOL: CPT

## 2025-07-23 LAB — DHEA-S SERPL-MCNC: 258 UG/DL (ref 35–430)

## 2025-07-24 LAB — TESTOST SERPL-MCNC: 9 NG/DL (ref 8–60)

## 2025-07-28 NOTE — PROGRESS NOTES
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  Her most bothersome current symptoms had started immediately at the time of compression of the TMJ but prior that she had experienced jaw tightness.  Symptoms now include neck pain, head pressure, rocking vertigo, and visual problems that include double vision, blurred vision, and eye pain.  This has affected her ability to read. Head pressure is worse on the right side, around the eye, and is worse with leaning over, bending over, having her head down, and lying down. She has dizziness characterized as a rocking sensation as if she is on a boat. This feeling is worse with head movement but especially turning to the right. There is severe neck sensitivity and a feeling of neck instability. She has had brain fog, light sensitivity, auditory processing. She has a history of chronic abdominal bloating and pain after eating.      On exam she has diffuse neck, shoulder girdle, thoracic outlet, and atlas tenderness. She has a Beighton score of 4 out of 10, indicating hypermobility.      She has been in PT since age 15 for low back and hip pain, shoulder dislocations, frequent orthopedic injuries that are consistent with this hypermobility.   She has tried a number of treatments in the interim including 3 triptans, Ubrelvy, cyclobenzaprine, Toradol as well as a number of preventative treatments including gabapentin Qulipta, venlafaxine, topiramate, rimegepant, and Nerivio. None have been significantly helpful.      She is getting an evaluation at [place] for a possible CSF leak,  which is a good idea. We can provide complementary evaluation for whether she might have a high pressure syndrome from extracranial venous compression. These compressions are usually in the neck (atlas, mid-neck, thoracic outlet) and/or in the abdomen and pelvis. The compressions are typically dynamic, multifocal, and bilateral when the symptoms are severe. Neck pain is very typical of venous congestion syndromes since obstructed internal jugular and subclavian veins lead to shunting to the paravertebral venous plexus.      Recommendations:  US TOS/internal jugular vein   CT venogram head and neck head flexion  Will need a second level CTV with head turned to right and left as it may show different compressions.   US abdomen to rule out median arcuate ligament syndrome  CT venogram abdomen/pelvis to rule out left renal vein (Nutcracker) and left common iliac vein compression (May-Thurner) syndrome  Will need physical therapy--1st rib mobilization, anterior scalene stretches, ergonomic awareness  Depending on where the compressions are, she may need a referral to interventional radiology, a trial of neurotoxin for cervical dystonia, or a vascular surgery referral  Follow-up after imaging     Interim History 1/22/2025:  ULTRASOUND UPPER EXTREMITY VEIN AND PPG THORACIC OUTLET SYNDROME BILATERAL 12/23/2024  RIGHT       MID SUBCLAVIAN VEIN, sitting upright:            0 degrees: 63 cm/s, phasic            90 degrees: 38 cm/s, phasic          MID INTERNAL JUGULAR VEIN - patient sitting upright             Neutral: 158 cm/s             Right: 163 cm/s, mild waveform dampening     LEFT       MID SUBCLAVIAN VEIN, sitting upright:            0 degrees: 17 cm/s, phasic            90 degrees: 177 cm/s, phasic          MID INTERNAL JUGULAR VEIN - patient sitting upright:              Neutral: 123 cm/s             Right: 73 cm/s             Left: No flow/flow cessation     ABDOMINAL COMPLETE WITH DOPPLER ULTRASOUND 12/24/2024  1.   Peak systolic velocities in the celiac artery at the proximal/mid  segment measure 264 cm/s, suggesting greater than 50% stenosis based     CTV HEAD NECK W CONTRAST, 12/23/2024   IMPRESSION:   1. Mild narrowing of the upper right internal jugular vein anterior to the C1 transverse process with neck flexion. Also mild narrowing of  the upper left internal jugular vein anterior to the C1 transverse process in the neutral position, increasing with flexion. The  physiologic/clinical significance of this finding is uncertain and can be seen in the absence of symptoms.  2. Patent dural venous sinuses and major deep intracranial veins.     CTV with head turning to diagnose compression of the IJV under the SCM and CTV abdomen/pelvis to work up the celiac artery compression were ordered.  Patient had severe headache after the 1st CTV on 12-23-24, Lasix and Nurtec used. Headache increased the same day after the CTV, last for about 10 days. The only thing that helped was the Lasix, which she has been taking 3x 20mg. When she doesn't take it, there is more head pressure.   Thus, other CTVs were not scheduled.   There is constant throat pressure.     We discussed a plan forward given her worsened headache with the last CTV.   The CTV with the head turning can be very helpful to confirm entrapment of the IJV from overlying muscles.  Since she had a headache flare with the last contrast load, using a lower osmolarity agent may help. She can also more liberally use the Lasix after the next study.  Finally, we will start some methazolamide to see whether it helps with baseline head pressure.      PLAN:  CTV with head turning, Imaging with request to use Visipaque, which is iso-osmolar  Then CTA/CTV of the abdomen  Lasix 20mg TID  Start methazolamide titration.   3.   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   Is getting evaluation CSF leak evaluation at [place]     Interim History  2/28/2025:  2-21-25 CTV HEAD NECK W CONTRAST,   IMPRESSION:   1. Severe narrowing of the right internal jugular vein deep to the sternocleidomastoid muscle with head turned to the right, resolving  with head turned to the left. Severe narrowing of the upper right internal jugular vein anterior to the C1 transverse process with head  turned to left. Severe narrowing of the upper left internal jugular vein anterior to the C1 transverse process with head turned to the  right. The physiologic/clinical significance of this finding is uncertain and can be seen in the absence of symptoms.  2. Patent dural venous sinuses and major deep intracranial veins.     1-21-25 MR CERVICAL SPINE WITHOUT IV CONTRAST, MR LUMBAR SPINE WITHOUT IV CONTRAST, MR THORACIC SPINE WITHOUT IV CONTRAST   No evidence of CSF leak.      Is taking 10-20mg Lasix per day. Has not started the methazolamide.   No change in symptoms so far.   She has already been doing PT.      PLAN:  Trial of [facility]--Reconnection in [place] is the closest to her  Start acetazolamide titration for the head pressure  CTV Abdomen/Pelvis with Visipaque--[phone]  Consideration of cerebral venography to confirm C1 level compression  Consider SCM/ASM injections for cervical dystonia for the SCM     Interim History 7/30/2025:  CTA/CTV ABDOMEN PELVIS 6/17/2025   IMPRESSION:   1. Left renal vein nutcracker anatomy and May-Thurner anatomy not  demonstrated. No ovarian venous reflux suggested in the arterial  phase. 2. Hepatic steatosis. 3. Gallbladder sludge.     Cerebral venogram scheduled for 8-28-25  Experience with acetazolamide-- had tried it for a few days. Got very fatigued and stopped   Went on scuba diving late May in [facility] and head pressure worsened. Was not on acetazolamide then.   She did Lasix for about 2 weeks after the scuba incident and is now back to baseline.     Other history obtained with [facility] Tom Kurtz below;  Since LOV 2/2025,  changes:  -brain fog:  -slight worsening; seemingly worsens acutely before and after a migraine, but there are other times the brain fogs seems to worsen but without known trigger/pattern.   -duration: hours-days   -caffeine may help, but unsure   -worse w/ elevated environmental stimui - e.g. getting asked questions, bright lights, loud sounds  -presyncope and nausea:   -possibly there before LOV (2/2025), but definitively more noticeable in past 2-3mo   -onset: 1-2hrs after eating, usually in the afternoon   -duration: 5 minutes max   -improves w/ hard candy that she can suck on   -possible worsening w/ heat, but not certain   -no relation to exercise/movement      Current Outpatient Medications:     acetylcysteine (N-ACETYL CYSTEINE) 600 MG CAPS capsule, Take 1,800 mg by mouth daily. Pt takes 2 capsules of 900 mg for a total of 1800, Disp: , Rfl:     Chaste Tree (VITEX FRUIT) 400 MG CAPS, Take 2 capsules by mouth daily. Pt is taking 2 500 mg capsules, 1000 daily, Disp: , Rfl:     co-enzyme Q-10 100 MG CAPS capsule, Take 100 mg by mouth daily., Disp: , Rfl:     cyclobenzaprine (FLEXERIL) 5 MG tablet, Take 1 tablet (5 mg) by mouth at bedtime (Patient taking differently: Take 5 mg by mouth daily as needed.), Disp: 30 tablet, Rfl: 11    furosemide (LASIX) 20 MG tablet, Please take 20 mg lasix by mouth every 6 hours for 3 days if experiencing post Cerebral venogram headache, may refill/repeat if continued headache, Disp: 12 tablet, Rfl: 1    ibuprofen (ADVIL/MOTRIN) 200 MG tablet, Take 200 mg by mouth as needed., Disp: , Rfl:     lisdexamfetamine (VYVANSE) 10 MG capsule, Take 1 capsule (10 mg) by mouth every morning., Disp: 30 capsule, Rfl: 0    Magnesium Citrate 100 MG TABS, Take 2 tablets by mouth every 24 hours., Disp: , Rfl:     Nerve Stimulator (GAMMACORE) QUYNH, , Disp: , Rfl:     omeprazole (PRILOSEC) 20 MG DR capsule, Take 1 capsule (20 mg) by mouth daily, Disp: 60 capsule, Rfl: 1    ondansetron (ZOFRAN ODT) 4  "MG ODT tab, Take 1 tablet by mouth every 8 hours as needed, Disp: , Rfl:     rimegepant (NURTEC) 75 MG ODT tablet, Place 1 tablet (75 mg) under the tongue daily as needed for migraine. Maximum of 1 tablet every 24 hours., Disp: 16 tablet, Rfl: 11    timolol maleate (TIMOPTIC) 0.5 % ophthalmic solution, 1 drop 2 times daily as needed., Disp: , Rfl:     venlafaxine (EFFEXOR XR) 75 MG 24 hr capsule, Take 1 capsule (75 mg) by mouth daily., Disp: 30 capsule, Rfl: 11    Vitamin D-Vitamin K (D3 + K2 DOTS) 1000-90 UNIT-MCG TABS, Take 1 tablet by mouth daily., Disp: , Rfl:     EXAM:   /85 (BP Location: Right arm, Patient Position: Sitting, Cuff Size: Adult Large)   Pulse 93   Resp 16   Ht 1.626 m (5' 4\")   Wt 82.4 kg (181 lb 9.6 oz)   SpO2 98%   BMI 31.17 kg/m    Patient presents with a friend. Our visit was audio recorded per her request. She gives excellent history.     We spent the entirety of our time answering questions that Екатерина had about the pathophysiology of these bilateral IJV compressions at C1 and mid-neck that are likely muscular.   The next step as planned is her cerebral venogram.     If the compressions are predominantly at C1, I would refer her for consideration of a decompression procedure. This would involve a styloidectomy, resection of the posterior belly of the digastric muscle, and a C1 shave.     If the compressions are predominantly at the C4-C6 level, there are most likely muscular and should be initially treated with neurotoxin and a future consideration for a muscle shave (omohyoid and sternocleidomastoid).    If there are tandem compressions of comparable severity, then it would make more sense to open the higher compression first and then work on the lower compressions after the higher ones are opened.     We'll touch base after her venogram.    DATA:  I personally reviewed the following data.     Last brain imaging:  CTV Head Neck w Contrast  Narrative: EXAM: CTV HEAD NECK W " CONTRAST, 2/21/2025 11:11 AM     HISTORY:  34F with headache, vertigo, neck pain. Left IJV flow  obstructs with head turning.; Compression of vein; Neck pain; Vertigo;  Intractable chronic migraine without aura and with status migrainosus;  Pressure in head.     COMPARISON:  None       TECHNIQUE: Helically acquired thin section axial CT images were  obtained with 1 mm collimation through the head and neck after  intravenous bolus injection of iodinated contrast medium with a delay  between administration of contrast and scanning. Imaging performed  with return to the right and with head turned to the left. Image data  were sent to the 3D workstation and postprocessed by the technologist  using maximum intensity pixel (MIP), multiplanar and volume rendered  3D reconstruction programs. I personally participated in the 3D  reconstruction process and interpretation of the final, archived 3D  images on an independent workstation.     CONTRAST: Visipaque 320 75cc.     FINDINGS:  No thrombosis or stenosis of the major intracranial dural  sinuses or deep cerebral veins.      The right styloid process measures 9 mm with calcification of the  stylohyoid ligament along a length of 13 mm. The left styloid process  measures 7 mm with calcification of the stylohyoid ligament along the  length of 14 mm.     RIGHT:  With head turned to the right, there is severe narrowing of the right  internal jugular vein deep to the sternocleidomastoid muscle from the  level of the upper thyroid gland up to the internal carotid artery.  This resolves with head turned to the left. However, there is severe  narrowing of the upper right internal jugular vein anterior to the C1  transverse process with residual AP diameter of 2 mm with head turned  to the left.     LEFT:  With head turned to the right, there is severe narrowing of the left  internal jugular vein anterior to the C1 transverse process with  residual AP diameter of less than 1 mm. This  resolves with head turned  to the left.     Clear paranasal sinuses and mastoid air cells. The imaged skull base,  intracranial and orbital structures are within normal limits. No  suspicious finding in the visualized superior mediastinum/thorax.  Clear lung apices.  Impression: IMPRESSION:   1. Severe narrowing of the right internal jugular vein deep to the  sternocleidomastoid muscle with head turned to the right, resolving  with head turned to the left. Severe narrowing of the upper right  internal jugular vein anterior to the C1 transverse process with head  turned to left. Severe narrowing of the upper left internal jugular  vein anterior to the C1 transverse process with head turned to the  right. The physiologic/clinical significance of this finding is  uncertain and can be seen in the absence of symptoms.  2. Patent dural venous sinuses and major deep intracranial veins.     CAROLE SPIVEY      ALLERGIES:     Allergies   Allergen Reactions    Azithromycin Hives    Amoxicillin     Food      PN: strawberries, kiwi, grapefruit    Gluten Meal Other (See Comments)     Other Reaction(s): Other (see comments)    Told to refrain from gluten    Other Drug Allergy (See Comments) Other (See Comments)    Penicillins Hives, Other (See Comments) and Rash     Other Reaction(s): Not available    Succimer Rash        MEDICATIONS:    Current Outpatient Medications:     acetylcysteine (N-ACETYL CYSTEINE) 600 MG CAPS capsule, Take 1,800 mg by mouth daily. Pt takes 2 capsules of 900 mg for a total of 1800, Disp: , Rfl:     Chaste Tree (VITEX FRUIT) 400 MG CAPS, Take 2 capsules by mouth daily. Pt is taking 2 500 mg capsules, 1000 daily, Disp: , Rfl:     co-enzyme Q-10 100 MG CAPS capsule, Take 100 mg by mouth daily., Disp: , Rfl:     cyclobenzaprine (FLEXERIL) 5 MG tablet, Take 1 tablet (5 mg) by mouth at bedtime (Patient taking differently: Take 5 mg by mouth daily as needed.), Disp: 30 tablet, Rfl: 11    furosemide (LASIX)  20 MG tablet, Please take 20 mg lasix by mouth every 6 hours for 3 days if experiencing post Cerebral venogram headache, may refill/repeat if continued headache, Disp: 12 tablet, Rfl: 1    ibuprofen (ADVIL/MOTRIN) 200 MG tablet, Take 200 mg by mouth as needed., Disp: , Rfl:     lisdexamfetamine (VYVANSE) 10 MG capsule, Take 1 capsule (10 mg) by mouth every morning., Disp: 30 capsule, Rfl: 0    Magnesium Citrate 100 MG TABS, Take 2 tablets by mouth every 24 hours., Disp: , Rfl:     Nerve Stimulator (GAMMACORE) QUYNH, , Disp: , Rfl:     omeprazole (PRILOSEC) 20 MG DR capsule, Take 1 capsule (20 mg) by mouth daily, Disp: 60 capsule, Rfl: 1    ondansetron (ZOFRAN ODT) 4 MG ODT tab, Take 1 tablet by mouth every 8 hours as needed, Disp: , Rfl:     rimegepant (NURTEC) 75 MG ODT tablet, Place 1 tablet (75 mg) under the tongue daily as needed for migraine. Maximum of 1 tablet every 24 hours., Disp: 16 tablet, Rfl: 11    timolol maleate (TIMOPTIC) 0.5 % ophthalmic solution, 1 drop 2 times daily as needed., Disp: , Rfl:     venlafaxine (EFFEXOR XR) 75 MG 24 hr capsule, Take 1 capsule (75 mg) by mouth daily., Disp: 30 capsule, Rfl: 11    Vitamin D-Vitamin K (D3 + K2 DOTS) 1000-90 UNIT-MCG TABS, Take 1 tablet by mouth daily., Disp: , Rfl:        The longitudinal plan of care for the condition(s) below were addressed during this visit. Due to the added complexity in care, I will continue to support Екатерина in the subsequent management of this condition(s) and with the ongoing continuity of care of this condition(s).    Problem List Items Addressed This Visit as of 7/30/2025   None     42-minutes spent in evaluation, examination, and documentation on the date of service

## 2025-07-30 ENCOUNTER — OFFICE VISIT (OUTPATIENT)
Dept: NEUROLOGY | Facility: CLINIC | Age: 35
End: 2025-07-30
Payer: COMMERCIAL

## 2025-07-30 VITALS
DIASTOLIC BLOOD PRESSURE: 85 MMHG | BODY MASS INDEX: 31 KG/M2 | RESPIRATION RATE: 16 BRPM | SYSTOLIC BLOOD PRESSURE: 121 MMHG | WEIGHT: 181.6 LBS | HEIGHT: 64 IN | OXYGEN SATURATION: 98 % | HEART RATE: 93 BPM

## 2025-07-30 DIAGNOSIS — G54.0 TOS (THORACIC OUTLET SYNDROME): ICD-10-CM

## 2025-07-30 DIAGNOSIS — M24.20 EAGLE SYNDROME: ICD-10-CM

## 2025-07-30 DIAGNOSIS — I87.1 COMPRESSION OF VEIN: ICD-10-CM

## 2025-07-30 DIAGNOSIS — G24.3 CERVICAL DYSTONIA: Primary | ICD-10-CM

## 2025-07-30 DIAGNOSIS — R51.9 PRESSURE IN HEAD: ICD-10-CM

## 2025-07-30 DIAGNOSIS — G43.709 CHRONIC MIGRAINE WITHOUT AURA WITHOUT STATUS MIGRAINOSUS, NOT INTRACTABLE: ICD-10-CM

## 2025-07-30 DIAGNOSIS — M43.6 CONTRACTURE OF STERNOCLEIDOMASTOID MUSCLE: ICD-10-CM

## 2025-07-30 ASSESSMENT — PAIN SCALES - GENERAL: PAINLEVEL_OUTOF10: MODERATE PAIN (6)

## 2025-07-30 NOTE — LETTER
7/30/2025       RE: Екатерина Lucero  [address]  [facility] [address]     Dear Colleague,    Thank you for referring your patient, Екатерина Lucero, to the [facility]. Please see a copy of my visit note below.    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  Her most bothersome current symptoms had started immediately at the time of compression of the TMJ but prior that she had experienced jaw tightness.  Symptoms now include neck pain, head pressure, rocking vertigo, and visual problems that include double vision, blurred vision, and eye pain.  This has affected her ability to read. Head pressure is worse on the right side, around the eye, and is worse with leaning over, bending over, having her head down, and lying down. She has dizziness characterized as a rocking sensation as if she is on a boat. This feeling is worse with head movement but especially turning to the right. There is severe neck sensitivity and a feeling of neck instability. She has had brain fog, light sensitivity, auditory processing. She has a history of chronic abdominal bloating and pain after eating.      On exam she has diffuse neck, shoulder girdle, thoracic outlet, and atlas tenderness. She has a Beighton score of 4 out of 10, indicating hypermobility.      She has been in PT since age 15 for low back and hip pain, shoulder dislocations, frequent orthopedic injuries that are consistent with this hypermobility.   She has tried a number of  treatments in the interim including 3 triptans, Ubrelvy, cyclobenzaprine, Toradol as well as a number of preventative treatments including gabapentin Qulipta, venlafaxine, topiramate, rimegepant, and Nerivio. None have been significantly helpful.      She is getting an evaluation at [facility] for a possible CSF leak, which is a good idea. We can provide complementary evaluation for whether she might have a high pressure syndrome from extracranial venous compression. These compressions are usually in the neck (atlas, mid-neck, thoracic outlet) and/or in the abdomen and pelvis. The compressions are typically dynamic, multifocal, and bilateral when the symptoms are severe. Neck pain is very typical of venous congestion syndromes since obstructed internal jugular and subclavian veins lead to shunting to the paravertebral venous plexus.      Recommendations:  US TOS/internal jugular vein   CT venogram head and neck head flexion  Will need a second level CTV with head turned to right and left as it may show different compressions.   US abdomen to rule out median arcuate ligament syndrome  CT venogram abdomen/pelvis to rule out left renal vein (Nutcracker) and left common iliac vein compression (May-Thurner) syndrome  Will need physical therapy--1st rib mobilization, anterior scalene stretches, ergonomic awareness  Depending on where the compressions are, she may need a referral to interventional radiology, a trial of neurotoxin for cervical dystonia, or a vascular surgery referral  Follow-up after imaging     Interim History 1/22/2025:  ULTRASOUND UPPER EXTREMITY VEIN AND PPG THORACIC OUTLET SYNDROME BILATERAL 12/23/2024  RIGHT       MID SUBCLAVIAN VEIN, sitting upright:            0 degrees: 63 cm/s, phasic            90 degrees: 38 cm/s, phasic          MID INTERNAL JUGULAR VEIN - patient sitting upright             Neutral: 158 cm/s             Right: 163 cm/s, mild waveform dampening     LEFT       MID SUBCLAVIAN VEIN,  sitting upright:            0 degrees: 17 cm/s, phasic            90 degrees: 177 cm/s, phasic          MID INTERNAL JUGULAR VEIN - patient sitting upright:              Neutral: 123 cm/s             Right: 73 cm/s             Left: No flow/flow cessation     ABDOMINAL COMPLETE WITH DOPPLER ULTRASOUND 12/24/2024  1.  Peak systolic velocities in the celiac artery at the proximal/mid  segment measure 264 cm/s, suggesting greater than 50% stenosis based     CTV HEAD NECK W CONTRAST, 12/23/2024   IMPRESSION:   1. Mild narrowing of the upper right internal jugular vein anterior to the C1 transverse process with neck flexion. Also mild narrowing of  the upper left internal jugular vein anterior to the C1 transverse process in the neutral position, increasing with flexion. The  physiologic/clinical significance of this finding is uncertain and can be seen in the absence of symptoms.  2. Patent dural venous sinuses and major deep intracranial veins.     CTV with head turning to diagnose compression of the IJV under the SCM and CTV abdomen/pelvis to work up the celiac artery compression were ordered.  Patient had severe headache after the 1st CTV on 12-23-24, Lasix and Nurtec used. Headache increased the same day after the CTV, last for about 10 days. The only thing that helped was the Lasix, which she has been taking 3x 20mg. When she doesn't take it, there is more head pressure.   Thus, other CTVs were not scheduled.   There is constant throat pressure.     We discussed a plan forward given her worsened headache with the last CTV.   The CTV with the head turning can be very helpful to confirm entrapment of the IJV from overlying muscles.  Since she had a headache flare with the last contrast load, using a lower osmolarity agent may help. She can also more liberally use the Lasix after the next study.  Finally, we will start some methazolamide to see whether it helps with baseline head pressure.      PLAN:  CTV with head  turning, Imaging with request to use Visipaque, which is iso-osmolar  Then CTA/CTV of the abdomen  Lasix 20mg TID  Start methazolamide titration.   3.   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   Is getting evaluation CSF leak evaluation at [facility]     Interim History 2/28/2025:  2-21-25 CTV HEAD NECK W CONTRAST,   IMPRESSION:   1. Severe narrowing of the right internal jugular vein deep to the sternocleidomastoid muscle with head turned to the right, resolving  with head turned to the left. Severe narrowing of the upper right internal jugular vein anterior to the C1 transverse process with head  turned to left. Severe narrowing of the upper left internal jugular vein anterior to the C1 transverse process with head turned to the  right. The physiologic/clinical significance of this finding is uncertain and can be seen in the absence of symptoms.  2. Patent dural venous sinuses and major deep intracranial veins.     1-21-25 MR CERVICAL SPINE WITHOUT IV CONTRAST, MR LUMBAR SPINE WITHOUT IV CONTRAST, MR THORACIC SPINE WITHOUT IV CONTRAST   No evidence of CSF leak.      Is taking 10-20mg Lasix per day. Has not started the methazolamide.   No change in symptoms so far.   She has already been doing PT.      PLAN:  Trial of [facility]--Reconnection in [place] is the closest to her  Start acetazolamide titration for the head pressure  CTV Abdomen/Pelvis with Visipaque--[phone]  Consideration of cerebral venography to confirm C1 level compression  Consider SCM/ASM injections for cervical dystonia for the SCM     Interim History 7/30/2025:  CTA/CTV ABDOMEN PELVIS 6/17/2025   IMPRESSION:   1. Left renal vein nutcracker anatomy and May-Thurner anatomy not  demonstrated. No ovarian venous reflux suggested in the arterial  phase. 2. Hepatic steatosis. 3. Gallbladder sludge.     Cerebral venogram scheduled for 8-28-25  Experience with acetazolamide-- had tried it for a  few days. Got very fatigued and stopped   Went on scuba diving late May in [facility] and head pressure worsened. Was not on acetazolamide then.   She did Lasix for about 2 weeks after the scuba incident and is now back to baseline.     [facility];  Since LOV 2/2025, changes:  -brain fog:  -slight worsening; seemingly worsens acutely before and after a migraine, but there are other times the brain fogs seems to worsen but without known trigger/pattern.   -duration: hours-days   -caffeine may help, but unsure   -worse w/ elevated environmental stimui - e.g. getting asked questions, bright lights, loud sounds  -presyncope and nausea:   -possibly there before LOV (2/2025), but definitively more noticeable in past 2-3mo   -onset: 1-2hrs after eating, usually in the afternoon   -duration: 5 minutes max   -improves w/ hard candy that she can suck on   -possible worsening w/ heat, but not certain   -no relation to exercise/movement      Current Outpatient Medications:      acetylcysteine (N-ACETYL CYSTEINE) 600 MG CAPS capsule, Take 1,800 mg by mouth daily. Pt takes 2 capsules of 900 mg for a total of 1800, Disp: , Rfl:      Chaste Tree (VITEX FRUIT) 400 MG CAPS, Take 2 capsules by mouth daily. Pt is taking 2 500 mg capsules, 1000 daily, Disp: , Rfl:      co-enzyme Q-10 100 MG CAPS capsule, Take 100 mg by mouth daily., Disp: , Rfl:      cyclobenzaprine (FLEXERIL) 5 MG tablet, Take 1 tablet (5 mg) by mouth at bedtime (Patient taking differently: Take 5 mg by mouth daily as needed.), Disp: 30 tablet, Rfl: 11     furosemide (LASIX) 20 MG tablet, Please take 20 mg lasix by mouth every 6 hours for 3 days if experiencing post Cerebral venogram headache, may refill/repeat if continued headache, Disp: 12 tablet, Rfl: 1     ibuprofen (ADVIL/MOTRIN) 200 MG tablet, Take 200 mg by mouth as needed., Disp: , Rfl:      lisdexamfetamine (VYVANSE) 10 MG capsule, Take 1 capsule (10 mg) by mouth every  "morning., Disp: 30 capsule, Rfl: 0     Magnesium Citrate 100 MG TABS, Take 2 tablets by mouth every 24 hours., Disp: , Rfl:      Nerve Stimulator (GAMMACORE) QUYNH, , Disp: , Rfl:      omeprazole (PRILOSEC) 20 MG DR capsule, Take 1 capsule (20 mg) by mouth daily, Disp: 60 capsule, Rfl: 1     ondansetron (ZOFRAN ODT) 4 MG ODT tab, Take 1 tablet by mouth every 8 hours as needed, Disp: , Rfl:      rimegepant (NURTEC) 75 MG ODT tablet, Place 1 tablet (75 mg) under the tongue daily as needed for migraine. Maximum of 1 tablet every 24 hours., Disp: 16 tablet, Rfl: 11     timolol maleate (TIMOPTIC) 0.5 % ophthalmic solution, 1 drop 2 times daily as needed., Disp: , Rfl:      venlafaxine (EFFEXOR XR) 75 MG 24 hr capsule, Take 1 capsule (75 mg) by mouth daily., Disp: 30 capsule, Rfl: 11     Vitamin D-Vitamin K (D3 + K2 DOTS) 1000-90 UNIT-MCG TABS, Take 1 tablet by mouth daily., Disp: , Rfl:     EXAM:   /85 (BP Location: Right arm, Patient Position: Sitting, Cuff Size: Adult Large)   Pulse 93   Resp 16   Ht 1.626 m (5' 4\")   Wt 82.4 kg (181 lb 9.6 oz)   SpO2 98%   BMI 31.17 kg/m    Patient presents with a friend. Our visit was audio recorded per her request. She gives excellent history.     We spent the entirety of our time answering questions that Екатерина had about the pathophysiology of these bilateral IJV compressions at C1 and mid-neck that are likely muscular.   The next step as planned is her cerebral venogram.     If the compressions are predominantly at C1, I would refer her for consideration of a decompression procedure. This would involve a styloidectomy, resection of the posterior belly of the digastric muscle, and a C1 shave.     If the compressions are predominantly at the C4-C6 level, there are most likely muscular and should be initially treated with neurotoxin and a future consideration for a muscle shave (omohyoid and sternocleidomastoid).    If there are tandem compressions of comparable severity, " then it would make more sense to open the higher compression first and then work on the lower compressions after the higher ones are opened.     We'll touch base after her venogram.    DATA:  I personally reviewed the following data.     Last brain imaging:  CTV Head Neck w Contrast  Narrative: EXAM: CTV HEAD NECK W CONTRAST, 2/21/2025 11:11 AM     HISTORY:  34F with headache, vertigo, neck pain. Left IJV flow  obstructs with head turning.; Compression of vein; Neck pain; Vertigo;  Intractable chronic migraine without aura and with status migrainosus;  Pressure in head.     COMPARISON:  None       TECHNIQUE: Helically acquired thin section axial CT images were  obtained with 1 mm collimation through the head and neck after  intravenous bolus injection of iodinated contrast medium with a delay  between administration of contrast and scanning. Imaging performed  with return to the right and with head turned to the left. Image data  were sent to the 3D workstation and postprocessed by the technologist  using maximum intensity pixel (MIP), multiplanar and volume rendered  3D reconstruction programs. I personally participated in the 3D  reconstruction process and interpretation of the final, archived 3D  images on an independent workstation.     CONTRAST: Visipaque 320 75cc.     FINDINGS:  No thrombosis or stenosis of the major intracranial dural  sinuses or deep cerebral veins.      The right styloid process measures 9 mm with calcification of the  stylohyoid ligament along a length of 13 mm. The left styloid process  measures 7 mm with calcification of the stylohyoid ligament along the  length of 14 mm.     RIGHT:  With head turned to the right, there is severe narrowing of the right  internal jugular vein deep to the sternocleidomastoid muscle from the  level of the upper thyroid gland up to the internal carotid artery.  This resolves with head turned to the left. However, there is severe  narrowing of the upper right  internal jugular vein anterior to the C1  transverse process with residual AP diameter of 2 mm with head turned  to the left.     LEFT:  With head turned to the right, there is severe narrowing of the left  internal jugular vein anterior to the C1 transverse process with  residual AP diameter of less than 1 mm. This resolves with head turned  to the left.     Clear paranasal sinuses and mastoid air cells. The imaged skull base,  intracranial and orbital structures are within normal limits. No  suspicious finding in the visualized superior mediastinum/thorax.  Clear lung apices.  Impression: IMPRESSION:   1. Severe narrowing of the right internal jugular vein deep to the  sternocleidomastoid muscle with head turned to the right, resolving  with head turned to the left. Severe narrowing of the upper right  internal jugular vein anterior to the C1 transverse process with head  turned to left. Severe narrowing of the upper left internal jugular  vein anterior to the C1 transverse process with head turned to the  right. The physiologic/clinical significance of this finding is  uncertain and can be seen in the absence of symptoms.  2. Patent dural venous sinuses and major deep intracranial veins.     CAROLE SPIVEY      ALLERGIES:     Allergies   Allergen Reactions     Azithromycin Hives     Amoxicillin      Food      PN: strawberries, kiwi, grapefruit     Gluten Meal Other (See Comments)     Other Reaction(s): Other (see comments)    Told to refrain from gluten     Other Drug Allergy (See Comments) Other (See Comments)     Penicillins Hives, Other (See Comments) and Rash     Other Reaction(s): Not available     Succimer Rash        MEDICATIONS:    Current Outpatient Medications:      acetylcysteine (N-ACETYL CYSTEINE) 600 MG CAPS capsule, Take 1,800 mg by mouth daily. Pt takes 2 capsules of 900 mg for a total of 1800, Disp: , Rfl:      Chaste Tree (VITEX FRUIT) 400 MG CAPS, Take 2 capsules by mouth daily. Pt is taking  2 500 mg capsules, 1000 daily, Disp: , Rfl:      co-enzyme Q-10 100 MG CAPS capsule, Take 100 mg by mouth daily., Disp: , Rfl:      cyclobenzaprine (FLEXERIL) 5 MG tablet, Take 1 tablet (5 mg) by mouth at bedtime (Patient taking differently: Take 5 mg by mouth daily as needed.), Disp: 30 tablet, Rfl: 11     furosemide (LASIX) 20 MG tablet, Please take 20 mg lasix by mouth every 6 hours for 3 days if experiencing post Cerebral venogram headache, may refill/repeat if continued headache, Disp: 12 tablet, Rfl: 1     ibuprofen (ADVIL/MOTRIN) 200 MG tablet, Take 200 mg by mouth as needed., Disp: , Rfl:      lisdexamfetamine (VYVANSE) 10 MG capsule, Take 1 capsule (10 mg) by mouth every morning., Disp: 30 capsule, Rfl: 0     Magnesium Citrate 100 MG TABS, Take 2 tablets by mouth every 24 hours., Disp: , Rfl:      Nerve Stimulator (GAMMACORE) QUYNH, , Disp: , Rfl:      omeprazole (PRILOSEC) 20 MG DR capsule, Take 1 capsule (20 mg) by mouth daily, Disp: 60 capsule, Rfl: 1     ondansetron (ZOFRAN ODT) 4 MG ODT tab, Take 1 tablet by mouth every 8 hours as needed, Disp: , Rfl:      rimegepant (NURTEC) 75 MG ODT tablet, Place 1 tablet (75 mg) under the tongue daily as needed for migraine. Maximum of 1 tablet every 24 hours., Disp: 16 tablet, Rfl: 11     timolol maleate (TIMOPTIC) 0.5 % ophthalmic solution, 1 drop 2 times daily as needed., Disp: , Rfl:      venlafaxine (EFFEXOR XR) 75 MG 24 hr capsule, Take 1 capsule (75 mg) by mouth daily., Disp: 30 capsule, Rfl: 11     Vitamin D-Vitamin K (D3 + K2 DOTS) 1000-90 UNIT-MCG TABS, Take 1 tablet by mouth daily., Disp: , Rfl:        The longitudinal plan of care for the condition(s) below were addressed during this visit. Due to the added complexity in care, I will continue to support Екатерина in the subsequent management of this condition(s) and with the ongoing continuity of care of this condition(s).    Problem List Items Addressed This Visit as of 7/30/2025   None     42-minutes  spent in evaluation, examination, and documentation on the date of service      Again, thank you for allowing me to participate in the care of your patient.      Sincerely,    Lucila LOPEZ Cha, MD

## 2025-07-30 NOTE — NURSING NOTE
"Chief Complaint   Patient presents with    RECHECK     /85 (BP Location: Right arm, Patient Position: Sitting, Cuff Size: Adult Large)   Pulse 93   Resp 16   Ht 1.626 m (5' 4\")   Wt 82.4 kg (181 lb 9.6 oz)   SpO2 98%   BMI 31.17 kg/m      ANAMARIA VEE    "

## 2025-08-11 ENCOUNTER — TRANSFERRED RECORDS (OUTPATIENT)
Dept: ADMINISTRATIVE | Facility: CLINIC | Age: 35
End: 2025-08-11
Payer: COMMERCIAL

## 2025-08-18 DIAGNOSIS — R42 VERTIGO: ICD-10-CM

## 2025-08-18 DIAGNOSIS — H83.8X9 SUPERIOR SEMICIRCULAR CANAL DEHISCENCE, UNSPECIFIED LATERALITY: Primary | ICD-10-CM

## 2025-08-19 ENCOUNTER — PATIENT OUTREACH (OUTPATIENT)
Dept: CARE COORDINATION | Facility: CLINIC | Age: 35
End: 2025-08-19
Payer: COMMERCIAL

## 2025-08-21 ENCOUNTER — OFFICE VISIT (OUTPATIENT)
Dept: SLEEP MEDICINE | Facility: CLINIC | Age: 35
End: 2025-08-21
Payer: COMMERCIAL

## 2025-08-21 ENCOUNTER — PATIENT OUTREACH (OUTPATIENT)
Dept: CARE COORDINATION | Facility: CLINIC | Age: 35
End: 2025-08-21

## 2025-08-21 VITALS
HEIGHT: 64 IN | BODY MASS INDEX: 29.88 KG/M2 | OXYGEN SATURATION: 96 % | SYSTOLIC BLOOD PRESSURE: 104 MMHG | DIASTOLIC BLOOD PRESSURE: 69 MMHG | WEIGHT: 175 LBS | HEART RATE: 82 BPM

## 2025-08-21 DIAGNOSIS — G47.30 SLEEP APNEA, UNSPECIFIED TYPE: ICD-10-CM

## 2025-08-21 DIAGNOSIS — F51.5 NIGHTMARE DISORDER: Primary | ICD-10-CM

## 2025-08-21 DIAGNOSIS — G47.19 EXCESSIVE DAYTIME SLEEPINESS: ICD-10-CM

## 2025-08-21 RX ORDER — TIRZEPATIDE 2.5 MG/.5ML
INJECTION, SOLUTION SUBCUTANEOUS
COMMUNITY
Start: 2025-08-04

## 2025-08-21 RX ORDER — PRAZOSIN HYDROCHLORIDE 1 MG/1
1 CAPSULE ORAL AT BEDTIME
Qty: 30 CAPSULE | Refills: 1 | Status: SHIPPED | OUTPATIENT
Start: 2025-08-21

## 2025-08-21 ASSESSMENT — SLEEP AND FATIGUE QUESTIONNAIRES
HOW LIKELY ARE YOU TO NOD OFF OR FALL ASLEEP WHILE SITTING INACTIVE IN A PUBLIC PLACE: WOULD NEVER DOZE
HOW LIKELY ARE YOU TO NOD OFF OR FALL ASLEEP WHEN YOU ARE A PASSENGER IN A CAR FOR AN HOUR WITHOUT A BREAK: MODERATE CHANCE OF DOZING
HOW LIKELY ARE YOU TO NOD OFF OR FALL ASLEEP WHILE SITTING AND TALKING TO SOMEONE: WOULD NEVER DOZE
HOW LIKELY ARE YOU TO NOD OFF OR FALL ASLEEP WHILE WATCHING TV: SLIGHT CHANCE OF DOZING
HOW LIKELY ARE YOU TO NOD OFF OR FALL ASLEEP WHILE LYING DOWN TO REST IN THE AFTERNOON WHEN CIRCUMSTANCES PERMIT: HIGH CHANCE OF DOZING
HOW LIKELY ARE YOU TO NOD OFF OR FALL ASLEEP WHILE SITTING AND READING: SLIGHT CHANCE OF DOZING
HOW LIKELY ARE YOU TO NOD OFF OR FALL ASLEEP IN A CAR, WHILE STOPPED FOR A FEW MINUTES IN TRAFFIC: WOULD NEVER DOZE
HOW LIKELY ARE YOU TO NOD OFF OR FALL ASLEEP WHILE SITTING QUIETLY AFTER LUNCH WITHOUT ALCOHOL: WOULD NEVER DOZE

## 2025-08-25 ENCOUNTER — LAB (OUTPATIENT)
Dept: LAB | Facility: CLINIC | Age: 35
End: 2025-08-25
Payer: COMMERCIAL

## 2025-08-27 ENCOUNTER — VIRTUAL VISIT (OUTPATIENT)
Dept: DERMATOLOGY | Facility: CLINIC | Age: 35
End: 2025-08-27
Attending: STUDENT IN AN ORGANIZED HEALTH CARE EDUCATION/TRAINING PROGRAM
Payer: COMMERCIAL

## 2025-08-27 DIAGNOSIS — R51.9 HEADACHE: Primary | ICD-10-CM

## 2025-08-27 PROCEDURE — 98002 SYNCH AUDIO-VIDEO NEW MOD 45: CPT | Performed by: STUDENT IN AN ORGANIZED HEALTH CARE EDUCATION/TRAINING PROGRAM

## 2025-08-27 RX ORDER — SODIUM CHLORIDE 9 MG/ML
INJECTION, SOLUTION INTRAVENOUS CONTINUOUS
OUTPATIENT
Start: 2025-08-27

## 2025-08-27 RX ORDER — HEPARIN SODIUM 200 [USP'U]/100ML
1 INJECTION, SOLUTION INTRAVENOUS EVERY 5 MIN PRN
OUTPATIENT
Start: 2025-08-27

## 2025-08-28 ENCOUNTER — APPOINTMENT (OUTPATIENT)
Dept: INTERVENTIONAL RADIOLOGY/VASCULAR | Facility: CLINIC | Age: 35
End: 2025-08-28
Attending: STUDENT IN AN ORGANIZED HEALTH CARE EDUCATION/TRAINING PROGRAM
Payer: COMMERCIAL

## 2025-08-28 ENCOUNTER — HOSPITAL ENCOUNTER (OUTPATIENT)
Facility: CLINIC | Age: 35
Discharge: HOME | End: 2025-08-28
Attending: STUDENT IN AN ORGANIZED HEALTH CARE EDUCATION/TRAINING PROGRAM | Admitting: STUDENT IN AN ORGANIZED HEALTH CARE EDUCATION/TRAINING PROGRAM
Payer: COMMERCIAL

## 2025-08-28 VITALS
SYSTOLIC BLOOD PRESSURE: 115 MMHG | RESPIRATION RATE: 18 BRPM | HEART RATE: 90 BPM | TEMPERATURE: 98.2 F | DIASTOLIC BLOOD PRESSURE: 95 MMHG | OXYGEN SATURATION: 95 %

## 2025-08-28 DIAGNOSIS — I87.1 COMPRESSION OF VEIN: ICD-10-CM

## 2025-08-28 PROCEDURE — 36013 PLACE CATHETER IN ARTERY: CPT | Performed by: STUDENT IN AN ORGANIZED HEALTH CARE EDUCATION/TRAINING PROGRAM

## 2025-08-28 PROCEDURE — 75860 VEIN X-RAY NECK: CPT | Mod: 26 | Performed by: STUDENT IN AN ORGANIZED HEALTH CARE EDUCATION/TRAINING PROGRAM

## 2025-08-28 PROCEDURE — 272N000566 HC SHEATH CR3

## 2025-08-28 PROCEDURE — 76937 US GUIDE VASCULAR ACCESS: CPT | Mod: 26 | Performed by: STUDENT IN AN ORGANIZED HEALTH CARE EDUCATION/TRAINING PROGRAM

## 2025-08-28 PROCEDURE — 76937 US GUIDE VASCULAR ACCESS: CPT

## 2025-08-28 PROCEDURE — 75860 VEIN X-RAY NECK: CPT | Mod: XS

## 2025-08-28 PROCEDURE — 250N000009 HC RX 250: Performed by: STUDENT IN AN ORGANIZED HEALTH CARE EDUCATION/TRAINING PROGRAM

## 2025-08-28 PROCEDURE — 255N000002 HC RX 255 OP 636: Performed by: STUDENT IN AN ORGANIZED HEALTH CARE EDUCATION/TRAINING PROGRAM

## 2025-08-28 PROCEDURE — C1769 GUIDE WIRE: HCPCS

## 2025-08-28 PROCEDURE — 36012 PLACE CATHETER IN VEIN: CPT | Mod: 50 | Performed by: STUDENT IN AN ORGANIZED HEALTH CARE EDUCATION/TRAINING PROGRAM

## 2025-08-28 PROCEDURE — 250N000013 HC RX MED GY IP 250 OP 250 PS 637: Performed by: STUDENT IN AN ORGANIZED HEALTH CARE EDUCATION/TRAINING PROGRAM

## 2025-08-28 PROCEDURE — C1887 CATHETER, GUIDING: HCPCS

## 2025-08-28 PROCEDURE — 96372 THER/PROPH/DIAG INJ SC/IM: CPT | Performed by: STUDENT IN AN ORGANIZED HEALTH CARE EDUCATION/TRAINING PROGRAM

## 2025-08-28 PROCEDURE — 75860 VEIN X-RAY NECK: CPT

## 2025-08-28 PROCEDURE — 272N000504 HC NEEDLE CR4

## 2025-08-28 PROCEDURE — 75870 VEIN X-RAY SKULL: CPT | Mod: 26 | Performed by: STUDENT IN AN ORGANIZED HEALTH CARE EDUCATION/TRAINING PROGRAM

## 2025-08-28 PROCEDURE — 250N000011 HC RX IP 250 OP 636: Performed by: STUDENT IN AN ORGANIZED HEALTH CARE EDUCATION/TRAINING PROGRAM

## 2025-08-28 PROCEDURE — 36012 PLACE CATHETER IN VEIN: CPT | Mod: 50

## 2025-08-28 RX ORDER — ACETAMINOPHEN 500 MG
1000 TABLET ORAL ONCE
Status: COMPLETED | OUTPATIENT
Start: 2025-08-28 | End: 2025-08-28

## 2025-08-28 RX ORDER — LIDOCAINE HYDROCHLORIDE 10 MG/ML
1-30 INJECTION, SOLUTION EPIDURAL; INFILTRATION; INTRACAUDAL; PERINEURAL
Status: COMPLETED | OUTPATIENT
Start: 2025-08-28 | End: 2025-08-28

## 2025-08-28 RX ORDER — IODIXANOL 320 MG/ML
50 INJECTION, SOLUTION INTRAVASCULAR ONCE
Status: COMPLETED | OUTPATIENT
Start: 2025-08-28 | End: 2025-08-28

## 2025-08-28 RX ORDER — HEPARIN SODIUM 200 [USP'U]/100ML
1 INJECTION, SOLUTION INTRAVENOUS EVERY 5 MIN PRN
Status: DISCONTINUED | OUTPATIENT
Start: 2025-08-28 | End: 2025-08-29 | Stop reason: HOSPADM

## 2025-08-28 RX ADMIN — IODIXANOL 20 ML: 320 INJECTION, SOLUTION INTRAVASCULAR at 13:29

## 2025-08-28 RX ADMIN — ACETAMINOPHEN 975 MG: 325 TABLET ORAL at 14:03

## 2025-08-28 RX ADMIN — HEPARIN SODIUM 1 BAG: 200 INJECTION, SOLUTION INTRAVENOUS at 13:31

## 2025-08-28 RX ADMIN — LIDOCAINE HYDROCHLORIDE 5 ML: 10 INJECTION, SOLUTION EPIDURAL; INFILTRATION; INTRACAUDAL; PERINEURAL at 12:45

## 2025-08-28 ASSESSMENT — ACTIVITIES OF DAILY LIVING (ADL)
ADLS_ACUITY_SCORE: 47

## 2025-08-29 ASSESSMENT — ACTIVITIES OF DAILY LIVING (ADL)
ADLS_ACUITY_SCORE: 47

## 2025-09-03 DIAGNOSIS — R60.9 EDEMA: Primary | ICD-10-CM

## (undated) RX ORDER — KETOROLAC TROMETHAMINE 30 MG/ML
INJECTION, SOLUTION INTRAMUSCULAR; INTRAVENOUS
Status: DISPENSED
Start: 2024-07-05

## (undated) RX ORDER — LIDOCAINE HYDROCHLORIDE 10 MG/ML
INJECTION, SOLUTION EPIDURAL; INFILTRATION; INTRACAUDAL; PERINEURAL
Status: DISPENSED
Start: 2025-08-28

## (undated) RX ORDER — HEPARIN SODIUM 200 [USP'U]/100ML
INJECTION, SOLUTION INTRAVENOUS
Status: DISPENSED
Start: 2025-08-28

## (undated) RX ORDER — ACETAMINOPHEN 325 MG/1
TABLET ORAL
Status: DISPENSED
Start: 2025-08-28